# Patient Record
Sex: FEMALE | Race: WHITE | Employment: OTHER | URBAN - METROPOLITAN AREA
[De-identification: names, ages, dates, MRNs, and addresses within clinical notes are randomized per-mention and may not be internally consistent; named-entity substitution may affect disease eponyms.]

---

## 2017-01-19 ENCOUNTER — HOSPITAL ENCOUNTER (OUTPATIENT)
Dept: LAB | Age: 77
Discharge: HOME | End: 2017-01-19
Attending: INTERNAL MEDICINE
Payer: MEDICARE

## 2017-01-19 DIAGNOSIS — N18.30 CHRONIC KIDNEY DISEASE, STAGE 3 (HCC): ICD-10-CM

## 2017-01-19 LAB
ANION GAP BLD CALC-SCNC: 9 MMOL/L
BUN SERPL-MCNC: 27 MG/DL (ref 8–23)
CALCIUM SERPL-MCNC: 9.2 MG/DL (ref 8.3–10.4)
CHLORIDE SERPL-SCNC: 101 MMOL/L (ref 98–107)
CO2 SERPL-SCNC: 29 MMOL/L (ref 23–32)
CREAT SERPL-MCNC: 1.4 MG/DL (ref 0.6–1)
GLUCOSE SERPL-MCNC: 113 MG/DL (ref 65–100)
POTASSIUM SERPL-SCNC: 4.4 MMOL/L (ref 3.5–5.1)
SODIUM SERPL-SCNC: 139 MMOL/L (ref 136–145)

## 2017-01-19 PROCEDURE — 80048 BASIC METABOLIC PNL TOTAL CA: CPT | Performed by: INTERNAL MEDICINE

## 2017-01-19 PROCEDURE — 36415 COLL VENOUS BLD VENIPUNCTURE: CPT | Performed by: INTERNAL MEDICINE

## 2017-05-24 ENCOUNTER — HOSPITAL ENCOUNTER (OUTPATIENT)
Dept: MAMMOGRAPHY | Age: 77
Discharge: HOME | End: 2017-05-24
Attending: FAMILY MEDICINE
Payer: MEDICARE

## 2017-05-24 DIAGNOSIS — Z12.31 VISIT FOR SCREENING MAMMOGRAM: ICD-10-CM

## 2017-05-24 PROCEDURE — 77067 SCR MAMMO BI INCL CAD: CPT

## 2018-05-14 PROBLEM — I10 BENIGN ESSENTIAL HTN: Status: ACTIVE | Noted: 2018-05-14

## 2018-05-14 PROBLEM — M17.0 PRIMARY OSTEOARTHRITIS OF BOTH KNEES: Status: ACTIVE | Noted: 2018-05-14

## 2018-05-14 PROBLEM — R53.82 CHRONIC FATIGUE: Status: ACTIVE | Noted: 2018-05-14

## 2018-05-14 PROBLEM — E66.01 SEVERE OBESITY (BMI 35.0-39.9) WITH COMORBIDITY (HCC): Status: ACTIVE | Noted: 2018-05-14

## 2018-06-15 PROBLEM — Z00.00 ROUTINE GENERAL MEDICAL EXAMINATION AT A HEALTH CARE FACILITY: Status: ACTIVE | Noted: 2018-06-15

## 2018-07-02 ENCOUNTER — HOSPITAL ENCOUNTER (OUTPATIENT)
Dept: MAMMOGRAPHY | Age: 78
Discharge: HOME | End: 2018-07-02
Attending: FAMILY MEDICINE
Payer: MEDICARE

## 2018-07-02 DIAGNOSIS — Z12.31 VISIT FOR SCREENING MAMMOGRAM: ICD-10-CM

## 2018-07-02 PROCEDURE — 77067 SCR MAMMO BI INCL CAD: CPT

## 2018-11-01 PROBLEM — M94.0 COSTOCHONDRITIS: Status: ACTIVE | Noted: 2018-11-01

## 2018-11-19 PROBLEM — J30.9 ALLERGIC RHINITIS: Status: ACTIVE | Noted: 2018-11-19

## 2019-02-05 PROBLEM — J01.00 ACUTE NON-RECURRENT MAXILLARY SINUSITIS: Status: ACTIVE | Noted: 2019-02-05

## 2019-04-23 PROBLEM — M79.671 RIGHT FOOT PAIN: Status: ACTIVE | Noted: 2019-04-23

## 2019-05-13 PROBLEM — R19.7 DIARRHEA: Status: ACTIVE | Noted: 2019-05-13

## 2019-05-13 PROBLEM — K57.92 DIVERTICULITIS: Status: ACTIVE | Noted: 2019-05-13

## 2019-05-20 ENCOUNTER — HOSPITAL ENCOUNTER (OUTPATIENT)
Dept: CT IMAGING | Age: 79
Discharge: HOME | End: 2019-05-20
Attending: FAMILY MEDICINE
Payer: MEDICARE

## 2019-05-20 DIAGNOSIS — K57.92 DIVERTICULITIS: ICD-10-CM

## 2019-05-20 LAB — CREAT BLD-MCNC: 1.3 MG/DL (ref 0.8–1.5)

## 2019-05-20 PROCEDURE — 74176 CT ABD & PELVIS W/O CONTRAST: CPT

## 2019-05-20 PROCEDURE — 82565 ASSAY OF CREATININE: CPT

## 2019-05-20 PROCEDURE — 74011636320 HC RX REV CODE- 636/320: Performed by: FAMILY MEDICINE

## 2019-05-20 RX ADMIN — DIATRIZOATE MEGLUMINE AND DIATRIZOATE SODIUM 15 ML: 660; 100 LIQUID ORAL; RECTAL at 15:05

## 2019-05-20 NOTE — PROGRESS NOTES
Nothing obvious on her CT. Will wait on lab results. If they are also normal I think a trip to the gastroenterologist may be warranted. If labs look like infection we will treat it.

## 2019-05-24 PROBLEM — N28.89 RENAL MASS, LEFT: Status: ACTIVE | Noted: 2019-05-24

## 2019-05-24 PROBLEM — R10.12 LEFT UPPER QUADRANT PAIN: Status: ACTIVE | Noted: 2019-05-24

## 2019-05-28 PROBLEM — G89.29 CHRONIC ABDOMINAL PAIN: Status: ACTIVE | Noted: 2019-05-28

## 2019-05-28 PROBLEM — R10.9 CHRONIC ABDOMINAL PAIN: Status: ACTIVE | Noted: 2019-05-28

## 2019-06-20 ENCOUNTER — HOSPITAL ENCOUNTER (OUTPATIENT)
Dept: CT IMAGING | Age: 79
Discharge: HOME | End: 2019-06-20
Attending: UROLOGY
Payer: MEDICARE

## 2019-06-20 DIAGNOSIS — N28.89 RENAL MASS: ICD-10-CM

## 2019-06-20 LAB — CREAT BLD-MCNC: 1.2 MG/DL (ref 0.8–1.5)

## 2019-06-20 PROCEDURE — 74011000258 HC RX REV CODE- 258: Performed by: UROLOGY

## 2019-06-20 PROCEDURE — 74011636320 HC RX REV CODE- 636/320: Performed by: UROLOGY

## 2019-06-20 PROCEDURE — 82565 ASSAY OF CREATININE: CPT

## 2019-06-20 PROCEDURE — 74170 CT ABD WO CNTRST FLWD CNTRST: CPT

## 2019-06-20 RX ORDER — SODIUM CHLORIDE 0.9 % (FLUSH) 0.9 %
10 SYRINGE (ML) INJECTION
Status: COMPLETED | OUTPATIENT
Start: 2019-06-20 | End: 2019-06-20

## 2019-06-20 RX ADMIN — SODIUM CHLORIDE 100 ML: 900 INJECTION, SOLUTION INTRAVENOUS at 09:34

## 2019-06-20 RX ADMIN — Medication 10 ML: at 09:34

## 2019-06-20 RX ADMIN — IOPAMIDOL 100 ML: 755 INJECTION, SOLUTION INTRAVENOUS at 09:34

## 2019-06-25 PROBLEM — N28.1 RENAL CYST: Status: ACTIVE | Noted: 2019-05-24

## 2019-06-25 PROBLEM — Z23 ENCOUNTER FOR IMMUNIZATION: Status: ACTIVE | Noted: 2019-06-25

## 2019-09-19 PROBLEM — Z23 ENCOUNTER FOR IMMUNIZATION: Status: RESOLVED | Noted: 2019-06-25 | Resolved: 2019-09-19

## 2019-10-23 PROBLEM — K21.9 GASTROESOPHAGEAL REFLUX DISEASE WITHOUT ESOPHAGITIS: Status: ACTIVE | Noted: 2019-10-23

## 2020-05-14 PROBLEM — I35.0 NONRHEUMATIC AORTIC VALVE STENOSIS: Status: ACTIVE | Noted: 2020-05-14

## 2020-12-18 ENCOUNTER — HOSPITAL ENCOUNTER (OUTPATIENT)
Dept: ULTRASOUND IMAGING | Age: 80
Discharge: HOME | End: 2020-12-18
Attending: INTERNAL MEDICINE
Payer: MEDICARE

## 2020-12-18 DIAGNOSIS — I10 HYPERTENSION, ESSENTIAL: ICD-10-CM

## 2020-12-18 PROCEDURE — 93975 VASCULAR STUDY: CPT

## 2021-06-30 PROBLEM — Z00.00 ENCOUNTER FOR ANNUAL WELLNESS EXAM IN MEDICARE PATIENT: Status: ACTIVE | Noted: 2021-06-30

## 2021-07-30 PROBLEM — Z23 ENCOUNTER FOR IMMUNIZATION: Status: RESOLVED | Noted: 2019-06-25 | Resolved: 2021-07-30

## 2021-07-30 PROBLEM — Z00.00 ENCOUNTER FOR ANNUAL WELLNESS EXAM IN MEDICARE PATIENT: Status: RESOLVED | Noted: 2021-06-30 | Resolved: 2021-07-30

## 2022-03-18 PROBLEM — N28.1 RENAL CYST: Status: ACTIVE | Noted: 2019-05-24

## 2022-03-18 PROBLEM — E66.01 SEVERE OBESITY (BMI 35.0-39.9) WITH COMORBIDITY (HCC): Status: ACTIVE | Noted: 2018-05-14

## 2022-03-18 PROBLEM — J30.9 ALLERGIC RHINITIS: Status: ACTIVE | Noted: 2018-11-19

## 2022-03-18 PROBLEM — Z00.00 ROUTINE GENERAL MEDICAL EXAMINATION AT A HEALTH CARE FACILITY: Status: ACTIVE | Noted: 2018-06-15

## 2022-03-18 PROBLEM — G89.29 CHRONIC ABDOMINAL PAIN: Status: ACTIVE | Noted: 2019-05-28

## 2022-03-18 PROBLEM — R10.9 CHRONIC ABDOMINAL PAIN: Status: ACTIVE | Noted: 2019-05-28

## 2022-03-19 PROBLEM — R19.7 DIARRHEA: Status: ACTIVE | Noted: 2019-05-13

## 2022-03-19 PROBLEM — R53.82 CHRONIC FATIGUE: Status: ACTIVE | Noted: 2018-05-14

## 2022-03-19 PROBLEM — M94.0 COSTOCHONDRITIS: Status: ACTIVE | Noted: 2018-11-01

## 2022-03-19 PROBLEM — J01.00 ACUTE NON-RECURRENT MAXILLARY SINUSITIS: Status: ACTIVE | Noted: 2019-02-05

## 2022-03-19 PROBLEM — K21.9 GASTROESOPHAGEAL REFLUX DISEASE WITHOUT ESOPHAGITIS: Status: ACTIVE | Noted: 2019-10-23

## 2022-03-19 PROBLEM — R10.12 LEFT UPPER QUADRANT PAIN: Status: ACTIVE | Noted: 2019-05-24

## 2022-03-19 PROBLEM — M17.0 PRIMARY OSTEOARTHRITIS OF BOTH KNEES: Status: ACTIVE | Noted: 2018-05-14

## 2022-03-19 PROBLEM — I10 BENIGN ESSENTIAL HTN: Status: ACTIVE | Noted: 2018-05-14

## 2022-03-19 PROBLEM — K57.92 DIVERTICULITIS: Status: ACTIVE | Noted: 2019-05-13

## 2022-03-20 PROBLEM — I35.0 NONRHEUMATIC AORTIC VALVE STENOSIS: Status: ACTIVE | Noted: 2020-05-14

## 2022-03-20 PROBLEM — M79.671 RIGHT FOOT PAIN: Status: ACTIVE | Noted: 2019-04-23

## 2022-04-12 PROBLEM — M10.9 ACUTE GOUT INVOLVING TOE OF RIGHT FOOT: Status: ACTIVE | Noted: 2022-04-12

## 2022-04-27 PROBLEM — R68.89 FLU-LIKE SYMPTOMS: Status: ACTIVE | Noted: 2022-04-27

## 2022-06-29 ENCOUNTER — OFFICE VISIT (OUTPATIENT)
Dept: FAMILY MEDICINE CLINIC | Facility: CLINIC | Age: 82
End: 2022-06-29
Payer: MEDICARE

## 2022-06-29 VITALS
HEIGHT: 63 IN | OXYGEN SATURATION: 93 % | HEART RATE: 72 BPM | SYSTOLIC BLOOD PRESSURE: 170 MMHG | DIASTOLIC BLOOD PRESSURE: 66 MMHG | WEIGHT: 221 LBS | BODY MASS INDEX: 39.16 KG/M2 | RESPIRATION RATE: 18 BRPM | TEMPERATURE: 96.8 F

## 2022-06-29 DIAGNOSIS — M10.9 ACUTE GOUT INVOLVING TOE OF RIGHT FOOT, UNSPECIFIED CAUSE: ICD-10-CM

## 2022-06-29 DIAGNOSIS — R73.03 PREDIABETES: ICD-10-CM

## 2022-06-29 DIAGNOSIS — I10 BENIGN ESSENTIAL HTN: Primary | ICD-10-CM

## 2022-06-29 DIAGNOSIS — E78.5 HYPERLIPIDEMIA, UNSPECIFIED HYPERLIPIDEMIA TYPE: ICD-10-CM

## 2022-06-29 DIAGNOSIS — M79.89 LEFT LEG SWELLING: ICD-10-CM

## 2022-06-29 DIAGNOSIS — I10 BENIGN ESSENTIAL HTN: ICD-10-CM

## 2022-06-29 DIAGNOSIS — N18.31 STAGE 3A CHRONIC KIDNEY DISEASE (HCC): ICD-10-CM

## 2022-06-29 DIAGNOSIS — J30.9 ALLERGIC RHINITIS, UNSPECIFIED SEASONALITY, UNSPECIFIED TRIGGER: ICD-10-CM

## 2022-06-29 PROCEDURE — 99214 OFFICE O/P EST MOD 30 MIN: CPT | Performed by: FAMILY MEDICINE

## 2022-06-29 PROCEDURE — 1123F ACP DISCUSS/DSCN MKR DOCD: CPT | Performed by: FAMILY MEDICINE

## 2022-06-29 RX ORDER — OLMESARTAN MEDOXOMIL 40 MG/1
40 TABLET ORAL DAILY
Qty: 90 TABLET | Refills: 1 | Status: SHIPPED | OUTPATIENT
Start: 2022-06-29

## 2022-06-29 RX ORDER — ALLOPURINOL 100 MG/1
100 TABLET ORAL DAILY
Qty: 90 TABLET | Refills: 1 | Status: SHIPPED | OUTPATIENT
Start: 2022-06-29

## 2022-06-29 RX ORDER — DOXAZOSIN MESYLATE 1 MG/1
1 TABLET ORAL DAILY
Qty: 90 TABLET | Refills: 1 | Status: SHIPPED | OUTPATIENT
Start: 2022-06-29

## 2022-06-29 RX ORDER — FUROSEMIDE 20 MG/1
20 TABLET ORAL DAILY
Qty: 90 TABLET | Refills: 1 | Status: SHIPPED | OUTPATIENT
Start: 2022-06-29

## 2022-06-29 RX ORDER — FLUTICASONE PROPIONATE 50 MCG
2 SPRAY, SUSPENSION (ML) NASAL DAILY
Qty: 3 EACH | Refills: 3 | Status: SHIPPED | OUTPATIENT
Start: 2022-06-29

## 2022-06-29 RX ORDER — CARVEDILOL 3.12 MG/1
3.12 TABLET ORAL 2 TIMES DAILY WITH MEALS
Qty: 180 TABLET | Refills: 1 | Status: SHIPPED | OUTPATIENT
Start: 2022-06-29

## 2022-06-29 ASSESSMENT — PATIENT HEALTH QUESTIONNAIRE - PHQ9
2. FEELING DOWN, DEPRESSED OR HOPELESS: 0
SUM OF ALL RESPONSES TO PHQ QUESTIONS 1-9: 0
SUM OF ALL RESPONSES TO PHQ QUESTIONS 1-9: 0
1. LITTLE INTEREST OR PLEASURE IN DOING THINGS: 0
SUM OF ALL RESPONSES TO PHQ9 QUESTIONS 1 & 2: 0
SUM OF ALL RESPONSES TO PHQ QUESTIONS 1-9: 0
SUM OF ALL RESPONSES TO PHQ QUESTIONS 1-9: 0

## 2022-06-29 ASSESSMENT — ENCOUNTER SYMPTOMS
RHINORRHEA: 1
BLOOD IN STOOL: 0
COUGH: 1
SHORTNESS OF BREATH: 0
CHEST TIGHTNESS: 0
ABDOMINAL PAIN: 0

## 2022-06-29 NOTE — PROGRESS NOTES
[place]  _______________________________________  MD Sisi Jimenez, RYANN Roberts MD Jetta Justin, [address]  Phone: [phone]  Fax: [phone]    Armando Gauthier (:  1940) is a 78 y.o. female,Established patient, here for evaluation of the following chief complaint(s):  6 Month Follow-Up and Swelling (Int eh right ankle and foot, on/off for a couple of weeks.)         ASSESSMENT/PLAN:    1. Benign essential HTN  Not controlled, but missing lasix will restart that. - furosemide (LASIX) 20 MG tablet; Take 1 tablet by mouth daily  Dispense: 90 tablet; Refill: 1  - carvedilol (COREG) 3.125 MG tablet; Take 1 tablet by mouth 2 times daily (with meals)  Dispense: 180 tablet; Refill: 1  - doxazosin (CARDURA) 1 MG tablet; Take 1 tablet by mouth daily  Dispense: 90 tablet; Refill: 1  - olmesartan (BENICAR) 40 MG tablet; Take 1 tablet by mouth daily TAKE ONE TABLET BY MOUTH ONE TIME DAILY  Dispense: 90 tablet; Refill: 1  - Comprehensive Metabolic Panel; Future    2. Acute gout involving toe of right foot, unspecified cause  Check gout levels, if still high, increase dosing of allipurinol.   - allopurinol (ZYLOPRIM) 100 MG tablet; Take 1 tablet by mouth daily  Dispense: 90 tablet; Refill: 1  - Comprehensive Metabolic Panel; Future  - Uric Acid; Future    3. Stage 3a chronic kidney disease ([facility] [address]  She has unilateral swelling that comes and goes, doesn't look like she's ever had renal US, will check that. - Comprehensive Metabolic Panel; Future  - US RETROPERITONEAL COMPLETE; Future    4. Hyperlipidemia, unspecified hyperlipidemia type  Stable on current therapy, will check LFTs. - Comprehensive Metabolic Panel; Future    5. Prediabetes  Stable, check A1C and renal function. - Comprehensive Metabolic Panel; Future  - Hemoglobin A1C; Future    6.  Allergic rhinitis, unspecified seasonality, unspecified trigger  Not controlled, add back flonase.   - fluticasone (FLONASE) 50 MCG/ACT nasal spray; 2 sprays by Nasal route daily  Dispense: 3 each; Refill: 3    7. Left leg swelling  As above, checking her kidneys. Does not look like a clot at this point.   - US RETROPERITONEAL COMPLETE; Future    FU 4w for AWV by phone    Subjective   SUBJECTIVE/OBJECTIVE:      HTN: She is currently also being seen by cardiology yearly. She was worked up for CP and this was negative. She has multiple medication intolerances including swelling with amlodipine and lisinopril. HCTZ caused gout. Bystolic caused bradycardia and she was switched to coreg. Her regimen is now coreg 3.125 BID, omlesartan 40, lasix 20 daily (added when she saw cardiology in late 2019) and doxazosin 1mg. She states BPs at home will run in the 140s/70s.  BP today is about where she lives. Saw Cards fall '21, they are now seeing her yearly. BP Readings from Last 3 Encounters:   06/29/22 (!) 170/66   04/12/22 (!) 166/86   12/30/21 (!) 148/79          She was found to have stable aortic sclerosis. Dr. Stacy Gonazlez recommended repeat echo if her exam or symptoms change. No ESTEBAN or chest pain. Has been doing yard work without issue. No ESTEBAN walking up stairs.      Lab Results   Component Value Date     04/12/2022    K 4.3 04/12/2022     04/12/2022    CO2 24 04/12/2022    BUN 24 04/12/2022    CREATININE 1.35 04/12/2022    GLUCOSE 102 04/12/2022    CALCIUM 9.8 04/12/2022    LABGLOM 46 06/20/2019           No results found for: LABA1C  No results found for: EAG    A1C 5.7 prevously, managed with diet.         AR: She has been using flonase with singulair with good results. Asks for refill of singulair.       Gout: Had some foot pain that turned out to be gout, now on allopurinol 100. Lab Results   Component Value Date    LABURIC 8.2 (H) 04/12/2022         HM:  AWV due next week    Review of Systems   Constitutional: Negative for chills and fever. HENT: Positive for rhinorrhea. Respiratory: Positive for cough. Negative for chest tightness and shortness of breath. Cardiovascular: Positive for leg swelling. Negative for chest pain. Gastrointestinal: Negative for abdominal pain and blood in stool. Genitourinary: Negative for hematuria. Neurological: Negative for syncope. Objective   Physical Exam  Vitals and nursing note reviewed. Constitutional:       Appearance: Normal appearance. HENT:      Head: Normocephalic and atraumatic. Right Ear: External ear normal.      Left Ear: External ear normal.      Ears:      Comments: Retracted TMs BL     Nose: Rhinorrhea present. Mouth/Throat:      Mouth: Mucous membranes are moist.   Eyes:      General: No scleral icterus. Extraocular Movements: Extraocular movements intact. Pupils: Pupils are equal, round, and reactive to light. Cardiovascular:      Rate and Rhythm: Normal rate and regular rhythm. Pulses: Normal pulses. Heart sounds: No murmur heard. No friction rub. No gallop. Pulmonary:      Effort: Pulmonary effort is normal. No respiratory distress. Breath sounds: Normal breath sounds. No stridor. No wheezing. Abdominal:      General: Bowel sounds are normal. There is no distension. Tenderness: There is no abdominal tenderness. There is no right CVA tenderness or left CVA tenderness. Comments: Obese   Musculoskeletal:         General: No swelling or tenderness. Normal range of motion. Cervical back: Normal range of motion. No rigidity. Right lower leg: Edema present. Left lower leg: No edema. Comments: 1+ pitting edema to the shins on the right    Skin:     General: Skin is warm and dry. Coloration: Skin is not pale. Neurological:      General: No focal deficit present. Mental Status: She is alert and oriented to person, place, and time. Mental status is at baseline.       Cranial Nerves: No cranial nerve deficit. Deep Tendon Reflexes: Reflexes normal.   Psychiatric:         Mood and Affect: Mood normal.         Behavior: Behavior normal.         Thought Content: Thought content normal.         Judgment: Judgment normal.                      An electronic signature was used to authenticate this note.     --Isaiah Michelle MD

## 2022-06-30 LAB
ALBUMIN SERPL-MCNC: 3.9 G/DL (ref 3.2–4.6)
ALBUMIN/GLOB SERPL: 1.4 {RATIO} (ref 1.2–3.5)
ALP SERPL-CCNC: 60 U/L (ref 50–136)
ALT SERPL-CCNC: 17 U/L (ref 12–65)
ANION GAP SERPL CALC-SCNC: 6 MMOL/L (ref 7–16)
AST SERPL-CCNC: 12 U/L (ref 15–37)
BILIRUB SERPL-MCNC: 0.5 MG/DL (ref 0.2–1.1)
BUN SERPL-MCNC: 25 MG/DL (ref 8–23)
CALCIUM SERPL-MCNC: 9.5 MG/DL (ref 8.3–10.4)
CHLORIDE SERPL-SCNC: 105 MMOL/L (ref 98–107)
CO2 SERPL-SCNC: 30 MMOL/L (ref 21–32)
CREAT SERPL-MCNC: 1.2 MG/DL (ref 0.6–1)
EST. AVERAGE GLUCOSE BLD GHB EST-MCNC: 114 MG/DL
GLOBULIN SER CALC-MCNC: 2.7 G/DL (ref 2.3–3.5)
GLUCOSE SERPL-MCNC: 109 MG/DL (ref 65–100)
HBA1C MFR BLD: 5.6 % (ref 4.2–6.3)
POTASSIUM SERPL-SCNC: 4.8 MMOL/L (ref 3.5–5.1)
PROT SERPL-MCNC: 6.6 G/DL (ref 6.3–8.2)
SODIUM SERPL-SCNC: 141 MMOL/L (ref 136–145)
URATE SERPL-MCNC: 5.3 MG/DL (ref 2.6–6)

## 2022-07-06 ENCOUNTER — HOSPITAL ENCOUNTER (OUTPATIENT)
Dept: ULTRASOUND IMAGING | Age: 82
Discharge: HOME | End: 2022-07-08
Payer: MEDICARE

## 2022-07-06 DIAGNOSIS — N18.31 STAGE 3A CHRONIC KIDNEY DISEASE (HCC): ICD-10-CM

## 2022-07-06 DIAGNOSIS — M79.89 LEFT LEG SWELLING: ICD-10-CM

## 2022-07-06 PROCEDURE — 76770 US EXAM ABDO BACK WALL COMP: CPT

## 2022-07-27 ENCOUNTER — TELEMEDICINE (OUTPATIENT)
Dept: FAMILY MEDICINE CLINIC | Facility: CLINIC | Age: 82
End: 2022-07-27
Payer: MEDICARE

## 2022-07-27 DIAGNOSIS — Z00.00 ENCOUNTER FOR ANNUAL WELLNESS EXAM IN MEDICARE PATIENT: Primary | ICD-10-CM

## 2022-07-27 PROCEDURE — 1123F ACP DISCUSS/DSCN MKR DOCD: CPT | Performed by: FAMILY MEDICINE

## 2022-07-27 PROCEDURE — G0439 PPPS, SUBSEQ VISIT: HCPCS | Performed by: FAMILY MEDICINE

## 2022-07-27 SDOH — ECONOMIC STABILITY: FOOD INSECURITY: WITHIN THE PAST 12 MONTHS, THE FOOD YOU BOUGHT JUST DIDN'T LAST AND YOU DIDN'T HAVE MONEY TO GET MORE.: NEVER TRUE

## 2022-07-27 SDOH — ECONOMIC STABILITY: FOOD INSECURITY: WITHIN THE PAST 12 MONTHS, YOU WORRIED THAT YOUR FOOD WOULD RUN OUT BEFORE YOU GOT MONEY TO BUY MORE.: NEVER TRUE

## 2022-07-27 ASSESSMENT — PATIENT HEALTH QUESTIONNAIRE - PHQ9
SUM OF ALL RESPONSES TO PHQ QUESTIONS 1-9: 0
1. LITTLE INTEREST OR PLEASURE IN DOING THINGS: 0
2. FEELING DOWN, DEPRESSED OR HOPELESS: 0
SUM OF ALL RESPONSES TO PHQ9 QUESTIONS 1 & 2: 0

## 2022-07-27 ASSESSMENT — SOCIAL DETERMINANTS OF HEALTH (SDOH): HOW HARD IS IT FOR YOU TO PAY FOR THE VERY BASICS LIKE FOOD, HOUSING, MEDICAL CARE, AND HEATING?: NOT HARD AT ALL

## 2022-07-27 ASSESSMENT — LIFESTYLE VARIABLES
HOW MANY STANDARD DRINKS CONTAINING ALCOHOL DO YOU HAVE ON A TYPICAL DAY: PATIENT DOES NOT DRINK
HOW OFTEN DO YOU HAVE A DRINK CONTAINING ALCOHOL: NEVER

## 2022-07-27 NOTE — PROGRESS NOTES
Medicare Annual Wellness Visit    Miguel Brice is here for Medicare AWV    Assessment & Plan     1. Encounter for annual wellness exam in Medicare patient  Doing well. Will complete living will . Does not want COVID vax. Reviewed prior DXA, she is low risk for osteoporosis. Will Get TD updated if she has a lac or puncture wound. FU 5m as planned    Recommendations for Preventive Services Due: see orders and patient instructions/AVS.  Recommended screening schedule for the next 5-10 years is provided to the patient in written form: see Patient Instructions/AVS.     Return for Medicare Annual Wellness Visit in 1 year. Subjective         HM: Has not had DXA in several years, was told in the past she had osteopenia. No fractures last DXA. IN the past had a tibial fracture as a child. Reviewed DXA from 2015, mild osteopenia. Patient's complete Health Risk Assessment and screening values have been reviewed and are found in Flowsheets. The following problems were reviewed today and where indicated follow up appointments were made and/or referrals ordered. Positive Risk Factor Screenings with Interventions:              Health Habits/Nutrition:  Physical Activity: Sufficiently Active    Days of Exercise per Week: 4 days    Minutes of Exercise per Session: 60 min     Have you lost any weight without trying in the past 3 months?: No     Have you seen the dentist within the past year?: Yes  Health Habits/Nutrition Interventions:  Inadequate physical activity:  Encouraged her to walk more, but indoors at this time of the year. Objective      Patient-Reported Vitals  No data recorded          Allergies   Allergen Reactions    Amlodipine Swelling    Amlodipine-Olmesartan Swelling    Atorvastatin Other (See Comments)    Lisinopril Swelling     Lip and severe facial swelling    Penicillins Rash     Prior to Visit Medications    Medication Sig Taking?  Authorizing Provider   furosemide [id number] [id number].

## 2022-07-27 NOTE — PATIENT INSTRUCTIONS
Personalized Preventive Plan for Shanel Whitehead - 7/27/2022  Medicare offers a range of preventive health benefits. Some of the tests and screenings are paid in full while other may be subject to a deductible, co-insurance, and/or copay. Some of these benefits include a comprehensive review of your medical history including lifestyle, illnesses that may run in your family, and various assessments and screenings as appropriate. After reviewing your medical record and screening and assessments performed today your provider may have ordered immunizations, labs, imaging, and/or referrals for you. A list of these orders (if applicable) as well as your Preventive Care list are included within your After Visit Summary for your review. Other Preventive Recommendations:    A preventive eye exam performed by an eye specialist is recommended every 1-2 years to screen for glaucoma; cataracts, macular degeneration, and other eye disorders. A preventive dental visit is recommended every 6 months. Try to get at least 150 minutes of exercise per week or 10,000 steps per day on a pedometer . Order or download the FREE \"Exercise & Physical Activity: Your Everyday Guide\" from [employer]. Call 5-597.403.4515 or search [employer] online. You need 8095-0561 mg of calcium and 8415-3458 IU of vitamin D per day. It is possible to meet your calcium requirement with diet alone, but a vitamin D supplement is usually necessary to meet this goal.  When exposed to the sun, use a sunscreen that protects against both UVA and UVB radiation with an SPF of 30 or greater. Reapply every 2 to 3 hours or after sweating, drying off with a towel, or swimming. Always wear a seat belt when traveling in a car. Always wear a helmet when riding a bicycle or motorcycle.

## 2022-09-08 ENCOUNTER — OFFICE VISIT (OUTPATIENT)
Dept: FAMILY MEDICINE CLINIC | Facility: CLINIC | Age: 82
End: 2022-09-08
Payer: MEDICARE

## 2022-09-08 VITALS
BODY MASS INDEX: 38.98 KG/M2 | HEIGHT: 63 IN | DIASTOLIC BLOOD PRESSURE: 75 MMHG | SYSTOLIC BLOOD PRESSURE: 143 MMHG | WEIGHT: 220 LBS

## 2022-09-08 DIAGNOSIS — I10 BENIGN ESSENTIAL HTN: ICD-10-CM

## 2022-09-08 DIAGNOSIS — M54.14 THORACIC RADICULOPATHY: Primary | ICD-10-CM

## 2022-09-08 PROCEDURE — 99214 OFFICE O/P EST MOD 30 MIN: CPT | Performed by: FAMILY MEDICINE

## 2022-09-08 PROCEDURE — 1123F ACP DISCUSS/DSCN MKR DOCD: CPT | Performed by: FAMILY MEDICINE

## 2022-09-08 RX ORDER — PREDNISONE 20 MG/1
40 TABLET ORAL DAILY
Qty: 10 TABLET | Refills: 0 | Status: SHIPPED | OUTPATIENT
Start: 2022-09-08 | End: 2022-09-13

## 2022-09-08 ASSESSMENT — PATIENT HEALTH QUESTIONNAIRE - PHQ9
SUM OF ALL RESPONSES TO PHQ QUESTIONS 1-9: 0
SUM OF ALL RESPONSES TO PHQ QUESTIONS 1-9: 0
SUM OF ALL RESPONSES TO PHQ9 QUESTIONS 1 & 2: 0
2. FEELING DOWN, DEPRESSED OR HOPELESS: 0
SUM OF ALL RESPONSES TO PHQ QUESTIONS 1-9: 0
1. LITTLE INTEREST OR PLEASURE IN DOING THINGS: 0
SUM OF ALL RESPONSES TO PHQ QUESTIONS 1-9: 0

## 2022-09-08 ASSESSMENT — ENCOUNTER SYMPTOMS
CHEST TIGHTNESS: 0
ABDOMINAL PAIN: 0
BLOOD IN STOOL: 0
BACK PAIN: 1
SHORTNESS OF BREATH: 0

## 2022-09-15 ENCOUNTER — OFFICE VISIT (OUTPATIENT)
Dept: FAMILY MEDICINE CLINIC | Facility: CLINIC | Age: 82
End: 2022-09-15
Payer: MEDICARE

## 2022-09-15 VITALS
SYSTOLIC BLOOD PRESSURE: 130 MMHG | HEIGHT: 63 IN | DIASTOLIC BLOOD PRESSURE: 60 MMHG | WEIGHT: 220 LBS | BODY MASS INDEX: 38.98 KG/M2

## 2022-09-15 DIAGNOSIS — M54.14 THORACIC RADICULOPATHY: ICD-10-CM

## 2022-09-15 DIAGNOSIS — R73.03 PREDIABETES: ICD-10-CM

## 2022-09-15 DIAGNOSIS — K21.9 GASTROESOPHAGEAL REFLUX DISEASE WITHOUT ESOPHAGITIS: ICD-10-CM

## 2022-09-15 DIAGNOSIS — I10 BENIGN ESSENTIAL HTN: Primary | ICD-10-CM

## 2022-09-15 DIAGNOSIS — I10 BENIGN ESSENTIAL HTN: ICD-10-CM

## 2022-09-15 DIAGNOSIS — N18.31 STAGE 3A CHRONIC KIDNEY DISEASE (HCC): ICD-10-CM

## 2022-09-15 LAB
ALBUMIN SERPL-MCNC: 3.8 G/DL (ref 3.2–4.6)
ALBUMIN/GLOB SERPL: 1.4 {RATIO} (ref 1.2–3.5)
ALP SERPL-CCNC: 57 U/L (ref 50–136)
ALT SERPL-CCNC: 33 U/L (ref 12–65)
ANION GAP SERPL CALC-SCNC: 3 MMOL/L (ref 4–13)
AST SERPL-CCNC: 13 U/L (ref 15–37)
BILIRUB SERPL-MCNC: 0.5 MG/DL (ref 0.2–1.1)
BUN SERPL-MCNC: 38 MG/DL (ref 8–23)
CALCIUM SERPL-MCNC: 9.3 MG/DL (ref 8.3–10.4)
CHLORIDE SERPL-SCNC: 106 MMOL/L (ref 101–110)
CO2 SERPL-SCNC: 30 MMOL/L (ref 21–32)
CREAT SERPL-MCNC: 1.5 MG/DL (ref 0.6–1)
EST. AVERAGE GLUCOSE BLD GHB EST-MCNC: 117 MG/DL
GLOBULIN SER CALC-MCNC: 2.7 G/DL (ref 2.3–3.5)
GLUCOSE SERPL-MCNC: 116 MG/DL (ref 65–100)
HBA1C MFR BLD: 5.7 % (ref 4.8–5.6)
POTASSIUM SERPL-SCNC: 4.1 MMOL/L (ref 3.5–5.1)
PROT SERPL-MCNC: 6.5 G/DL (ref 6.3–8.2)
SODIUM SERPL-SCNC: 139 MMOL/L (ref 136–145)

## 2022-09-15 PROCEDURE — 1123F ACP DISCUSS/DSCN MKR DOCD: CPT | Performed by: FAMILY MEDICINE

## 2022-09-15 PROCEDURE — 99214 OFFICE O/P EST MOD 30 MIN: CPT | Performed by: FAMILY MEDICINE

## 2022-09-15 ASSESSMENT — ENCOUNTER SYMPTOMS
CHEST TIGHTNESS: 0
BLOOD IN STOOL: 0
SHORTNESS OF BREATH: 0
ABDOMINAL PAIN: 0

## 2022-09-15 NOTE — PROGRESS NOTES
[place]  _______________________________________  MD Chirag Pulido Forward, DO  Meli Rodriguez, NP [place], [place]    [address] [address] , [address] [place] [address] Avenue  Phone: [phone]  Fax: [phone]    Cassy Sharif (:  1940) is a 78 y.o. female,Established patient, here for evaluation of the following chief complaint(s): Other (Feels like there is someone dragging a hair or feather across her R shoulder blade and armpit. Sometimes will feel like a burning a sensation. X 2 weeks )         ASSESSMENT/PLAN:    1. Benign essential HTN  BP really good today. Stable. Continue current regimen, check renal function. - Comprehensive Metabolic Panel; Future    2. Gastroesophageal reflux disease without esophagitis  Stable, continue current regimen. Check H/H.   - CBC with Auto Differential; Future    3. Thoracic radiculopathy  I am pretty sure this is what's going on. Will send to PT to help with this. If lymphocytes high on CBC, consider empiric valtrex before PT.   [facility], [facility]    4. Stage 3a chronic kidney disease (HCC)  Stable, continue current regimen. - Comprehensive Metabolic Panel; Future    5. Prediabetes  Stable, check A1C and renal function. - Comprehensive Metabolic Panel; Future  - Hemoglobin A1C; Future    FU 6m if labs OK    Subjective   SUBJECTIVE/OBJECTIVE:      HTN: She is currently also being seen by cardiology yearly. She was worked up for CP and this was negative. She has multiple medication intolerances including swelling with amlodipine and lisinopril. HCTZ caused gout. Bystolic caused bradycardia and she was switched to coreg. Her regimen is now coreg 3.125 BID, omlesartan 40, lasix 20 daily (added when she saw cardiology in late 2019) and doxazosin 1mg. She states BPs at home will run in the 140s/70s. BP today is about where she lives.  Saw Cards , they are ear normal.      Mouth/Throat:      Mouth: Mucous membranes are moist.   Eyes:      General: No scleral icterus. Extraocular Movements: Extraocular movements intact. Pupils: Pupils are equal, round, and reactive to light. Cardiovascular:      Rate and Rhythm: Normal rate and regular rhythm. Pulses: Normal pulses. Heart sounds: No murmur heard. No friction rub. No gallop. Pulmonary:      Effort: Pulmonary effort is normal. No respiratory distress. Breath sounds: Normal breath sounds. No stridor. No wheezing. Abdominal:      General: Bowel sounds are normal. There is no distension. Tenderness: There is no abdominal tenderness. There is no right CVA tenderness or left CVA tenderness. Comments: Obese   Musculoskeletal:         General: Tenderness present. No swelling. Normal range of motion. Cervical back: Normal range of motion. No rigidity. Right lower leg: No edema. Left lower leg: No edema. Comments: TTP near right T3-4 interspace, this makes her pain she described worse   Skin:     General: Skin is warm and dry. Coloration: Skin is not pale. Neurological:      General: No focal deficit present. Mental Status: She is alert and oriented to person, place, and time. Mental status is at baseline. Cranial Nerves: No cranial nerve deficit. Deep Tendon Reflexes: Reflexes normal.   Psychiatric:         Mood and Affect: Mood normal.         Behavior: Behavior normal.         Thought Content: Thought content normal.         Judgment: Judgment normal.                    An electronic signature was used to authenticate this note.     --Girish Block MD

## 2022-09-18 LAB
BASOPHILS # BLD: 0.1 K/UL (ref 0–0.2)
BASOPHILS NFR BLD: 1 % (ref 0–2)
DIFFERENTIAL METHOD BLD: ABNORMAL
EOSINOPHIL # BLD: 0.2 K/UL (ref 0–0.8)
EOSINOPHIL NFR BLD: 2 % (ref 0.5–7.8)
ERYTHROCYTE [DISTWIDTH] IN BLOOD BY AUTOMATED COUNT: 15.2 % (ref 11.9–14.6)
HCT VFR BLD AUTO: 48.8 % (ref 35.8–46.3)
HGB BLD-MCNC: 13.9 G/DL (ref 11.7–15.4)
IMM GRANULOCYTES # BLD AUTO: 0.1 K/UL (ref 0–0.5)
IMM GRANULOCYTES NFR BLD AUTO: 1 % (ref 0–5)
LYMPHOCYTES # BLD: 2.2 K/UL (ref 0.5–4.6)
LYMPHOCYTES NFR BLD: 24 % (ref 13–44)
MCH RBC QN AUTO: 30.4 PG (ref 26.1–32.9)
MCHC RBC AUTO-ENTMCNC: 28.5 G/DL (ref 31.4–35)
MCV RBC AUTO: 106.8 FL (ref 79.6–97.8)
MONOCYTES # BLD: 0.8 K/UL (ref 0.1–1.3)
MONOCYTES NFR BLD: 8 % (ref 4–12)
NEUTS SEG # BLD: 6.1 K/UL (ref 1.7–8.2)
NEUTS SEG NFR BLD: 65 % (ref 43–78)
NRBC # BLD: 0 K/UL (ref 0–0.2)
PLATELET # BLD AUTO: 286 K/UL (ref 150–450)
PMV BLD AUTO: 10.5 FL (ref 9.4–12.3)
RBC # BLD AUTO: 4.57 M/UL (ref 4.05–5.2)
WBC # BLD AUTO: 9.4 K/UL (ref 4.3–11.1)

## 2022-09-20 DIAGNOSIS — D53.1 MEGALOBLASTIC ANEMIA: ICD-10-CM

## 2022-09-20 DIAGNOSIS — D53.1 MEGALOBLASTIC ANEMIA: Primary | ICD-10-CM

## 2022-09-21 DIAGNOSIS — N18.32 STAGE 3B CHRONIC KIDNEY DISEASE (HCC): Primary | ICD-10-CM

## 2022-09-21 LAB
FOLATE SERPL-MCNC: 35.9 NG/ML (ref 3.1–17.5)
VIT B12 SERPL-MCNC: 403 PG/ML (ref 193–986)

## 2022-09-27 ENCOUNTER — HOSPITAL ENCOUNTER (OUTPATIENT)
Dept: PHYSICAL THERAPY | Age: 82
Setting detail: RECURRING SERIES
Discharge: HOME | End: 2022-09-30
Payer: MEDICARE

## 2022-09-27 PROCEDURE — 97161 PT EVAL LOW COMPLEX 20 MIN: CPT

## 2022-09-27 PROCEDURE — 97140 MANUAL THERAPY 1/> REGIONS: CPT

## 2022-09-27 ASSESSMENT — PAIN SCALES - GENERAL: PAINLEVEL_OUTOF10: 4

## 2022-09-27 NOTE — PROGRESS NOTES
Tad Castano  : 1940  Primary: [employer]  Secondary:  [address] [id number]  Phone: [phone]  Fax: [phone] Plan Frequency: 2x a week for 90 days    Plan of Care/Certification Expiration Date: 22      PT Visit Info:  No data recorded   Visit Count:  1   OUTPATIENT PHYSICAL THERAPY:OP NOTE TYPE: Treatment Note 2022       Episode  }Appt Desk             Treatment Diagnosis:  Thoracic Radiculopathy [M54.14]; Strain of Muscle and Tendon of Rotator Cuff, Sequela [S46.011S]; Medical/Referring Diagnosis:  Thoracic radiculopathy [M54.14]  Referring Physician:  Javan Lentz MD MD Orders:  PT Eval and Treat   Date of Onset:  Onset Date: 22     Allergies:   Amlodipine, Amlodipine-olmesartan, Atorvastatin, Lisinopril, and Penicillins  Restrictions/Precautions:  Restrictions/Precautions: None  No data recorded     Interventions Planned (Treatment may consist of any combination of the following):    Current Treatment Recommendations: Strengthening; ROM; Endurance training; Neuromuscular re-education; Manual Therapy - Soft Tissue Mobilization; Manual Therapy - Joint Manipulation; Home exercise program; Modalities; Integrated dry needling; Therapeutic activities     Subjective Comments: Pt. Notes abnormal sensation in the R posterior shoulder     Initial:}    4/10Post Session:       3/10  Medications Last Reviewed:  2022  Updated Objective Findings:  See evaluation note from today  Treatment   THERAPEUTIC EXERCISE: (5 minutes):    Exercises per grid below to improve mobility and strength. Required minimal visual, verbal, and manual cues to promote proper body alignment, promote proper body posture, and promote proper body mechanics. Progressed resistance, range, and repetitions as indicated.    Date:  2022   Activity/Exercise Parameters   Chest stretch 2 minutes   Latissimus stretch 2 minutes Scapular retraction 1 minute                     Manual Therapy (10  Minutes): Manual techniques to facilitate improved motion and decrease pain. (Used abbreviations; PNF - proprioceptive neuromuscular facilitation; a/p - anterior to posterior; p/a - posterior to anterior; cpa - central posterior anterior; upa -unilateral posterior anterior; SAL- superior anterior lateral glide; HVLAT - High Velocity Low Amplitude Thrust)  Soft tissue mobilizaiton: R lateral scapular musculature, subscapularis,   Manual cervical traction    Treatment/Session Summary:    Treatment Assessment:     Communication/Consultation:  None today  Equipment provided today:  None  Recommendations/Intent for next treatment session: Next visit will focus on soft tissue mobilization and manual therapy. Total Treatment Billable Duration:  45 minutes evaluation, 10 minutes manual therapy, 5 minutes therapeutic exercise.    Time In: 1430  Time Out: 410 [address] [place], PT       Charge Capture  }Post Session Pain  PT Visit Info  [employer] Portal  MD Guidelines  Scanned Media  Benefits  MyChart    Future Appointments   Date Time Provider [facility]   10/4/2022  2:30 PM Fleet Joy, PT [facility]   10/6/2022  2:30 PM Fleet Joy, PT [facility]   10/10/2022  2:30 PM Fleet Joy, PT [facility]   10/20/2022  2:30 PM Fleet Joy, PT [facility]   10/25/2022  2:30 PM Fleet Joy, PT [facility]   10/27/2022  2:30 PM Fleet Joy, PT [facility]   12/29/2022 11:20 AM Kraig Almazan MD [facility]   3/15/2023 11:00 AM Kraig Almazan MD [facility]

## 2022-09-27 NOTE — THERAPY EVALUATION
Tad Castano  : 1940  Primary: Sonia Almazan [employer]  Secondary:  [address] @ [address]  [address] [place], [place] [address]  Phone: [phone]  Fax: [phone] Plan Frequency: 2x a week for 90 days  Plan of Care/Certification Expiration Date: 22    PT Visit Info:         Visit Count:  1    OUTPATIENT PHYSICAL THERAPY:OP NOTE TYPE: Initial Assessment 2022               Episode  Appt Desk         Treatment Diagnosis:  Thoracic Radiculopathy [M54.14]; Strain of Muscle and Tendon of Rotator Cuff, Sequela [S46.011S]; Medical/Referring Diagnosis:  Thoracic radiculopathy [M54.14]  Referring Physician:  Javan Lentz MD MD Orders:  PT Eval and Treat   Return MD Appt:  TBD  Date of Onset:  Onset Date: 22   Allergies:  Amlodipine, Amlodipine-olmesartan, Atorvastatin, Lisinopril, and Penicillins  Restrictions/Precautions:    Restrictions/Precautions: None      Medications Last Reviewed:  2022     SUBJECTIVE   History of Injury/Illness (Reason for Referral):  Pt. Attends PT c/o R posterior shoulder tingling, burning, itching with gradual insidious onset the beginning of September. Pt. Notes she was trimming bushes which may have started symptoms. The abnormal sensation is located along the lateral shoulder blade, armpit, posterior arm. Pt. Notes symptoms increased while sleeping at night that will wake her up. Intensity comes and goes but is mostly there. Pt. Denies neck pain, weakness, etc.  Pt. Has been cleared of any heart complications. Pt. Would like to address remaining abnormal sensation.    Patient Stated Goal(s):  \"get rid of abnormal sensation in the shoulder\"  Initial:     4/10 Post Session:     3/10  Past Medical History/Comorbidities:   Ms. Katherin Lees  has a past medical history of Abnormal EKG, Arthritis, Chest pain, Chronic kidney disease, stage 3 ([facility] [address] Chronic pain, Elevated cholesterol, Hormone replacement therapy (HRT), Hypertension, Macular degeneration, Mixed hyperlipidemia, Obesity, Psoriasis, and Renal insufficiency. Ms. Ashok Mckeon  has a past surgical history that includes Total knee arthroplasty (Left, 2012); Cholecystectomy (1999); lap,cholecystectomy; Hysterectomy (1972); Ovary removal (1975); Cholecystectomy (2000); Tonsillectomy (1940's); and heent (1/3/12). Social History/Living Environment:   Lives With: Spouse  Type of Home: House  Home Layout: Two level     Prior Level of Function/Work/Activity:   Prior level of function: functioned independently without limitations. Learning:   Does the patient/guardian have any barriers to learning?: No barriers  Will there be a co-learner?: No  What is the preferred language of the patient/guardian?: English  Is an  required?: No  How does the patient/guardian prefer to learn new concepts?: Listening; Reading; Demonstration     Fall Risk Scale:    Mccall Total Score: 0  Mccall Fall Risk: Low (0-24)           OBJECTIVE   Observation:       Palpation:  Upper Trapezius: tender Levator Scapulae: Posterior Cervical   Sternocleidomastoid: Subscapularis: tender Lateral scapular muscles: tender       Flexibility:  limited pectoralis, upper trapezius latissimus dorsi      Range of Motion: Cervical in degrees  Flexion                 38                      Extension 40                      Right                     Left   Rotation 45 48   Side Bend 20 20     Thoracic flexion, sidebend, rotation WNL and no increase in symptoms  Central P/A to lower cervical and thoracic spine hypomobile, no changes in abnormal sensation    Strength: Manual Muscle Test: upper quarter screen 5/5 MMT, middle and lower trapezius 4/5 MMT      Special Testing:  Spurlings Right: (-) Left: (-)   Upper limb Tension Test (ULTT1)     Cervical Distraction (-) (-)   Cervical flexion rotation test     Other:       Neuro: UE reflexes 1+  ASSESSMENT   Initial Assessment:  Upon PT assessment, subjective symptoms appear radicular in nature for the R scapular region but provocative testing in the cervical and thoracic spine fails to reproduce or reduce subjective complaints. Pt. Demonstrates moderate myofascial tenderness to palpation of the R infraspinatus, teres major, teres minor, and latissimus dorsi musculature. Soft tissue mobilization to this region improves reported symptoms today. Pt. Demonstrates flexibility limitations in the pectoralis and latissimus dorsi musculature,  Mild strength deficits are present in the scapular region. Pt. Will benefit from manual therapy to address myofascial pain along with mobility exercises and manual cervical traction to address potential radicular symptosm. Problem List: (Impacting functional limitations): Body Structures, Functions, Activity Limitations Requiring Skilled Therapeutic Intervention: Decreased functional mobility ; Decreased ROM; Decreased strength; Decreased endurance; Increased pain; Decreased posture     Therapy Prognosis:   Therapy Prognosis: Excellent     Assessment Complexity:   Low Complexity  PLAN   Effective Dates: 9/27/2022 TO Plan of Care/Certification Expiration Date: 12/26/22   Frequency/Duration: Plan Frequency: 2x a week for 90 days   Interventions Planned (Treatment may consist of any combination of the following):    Current Treatment Recommendations: Strengthening; ROM; Endurance training; Neuromuscular re-education; Manual Therapy - Soft Tissue Mobilization; Manual Therapy - Joint Manipulation; Home exercise program; Modalities; Integrated dry needling; Therapeutic activities     Goals: (Goals have been discussed and agreed upon with patient.)    Discharge Goals: Time Frame: 6 weeks  Pt will report absence of R shoulder abnormal sensation for a week to improve symptoms. Pt. Will demonstrate 4+/5 MMT for B middle trapezius and lower trapezius musculature to improve strength.   Pt. Will be independent with HEP to prevent return of symptoms  Pt. Will score 0% on the NDI to demonstrate improved pain and function. Outcome Measure: Tool Used: Neck Disability Index (NDI)  Score:  Initial: 5/50  Most Recent: X/50 (Date: -- )   Interpretation of Score: The Neck Disability Index is a revised form of the Oswestry Low Back Pain Index and is designed to measure the activities of daily living in person's with neck pain. Each section is scored on a 0-5 scale, 5 representing the greatest disability. The scores of each section are added together for a total score of 50. Medical Necessity:   > Patient is expected to demonstrate progress in strength and range of motion to increase independence with ADL's and recreational activities. .  Reason For Services/Other Comments:  Patient will benefit from skilled PT to address impairments identified through evaluation and return to previous ADL and recreational capacity. Total Duration:  Time In: 1430  Time Out: 1530    Regarding Tad Alfredayolanda Castano's therapy, I certify that the treatment plan above will be carried out by a therapist or under their direction.   Thank you for this referral,  Morenita Monroe PT     Referring Physician Signature: Cristal Garcia MD                    Post Session Pain  Charge Capture  PT Visit Info MD Guidelines  Shmuel

## 2022-09-29 ENCOUNTER — APPOINTMENT (OUTPATIENT)
Dept: PHYSICAL THERAPY | Age: 82
End: 2022-09-29
Payer: MEDICARE

## 2022-10-04 ENCOUNTER — HOSPITAL ENCOUNTER (OUTPATIENT)
Dept: PHYSICAL THERAPY | Age: 82
Setting detail: RECURRING SERIES
Discharge: HOME | End: 2022-10-07
Payer: MEDICARE

## 2022-10-04 PROCEDURE — 97110 THERAPEUTIC EXERCISES: CPT

## 2022-10-04 PROCEDURE — 97140 MANUAL THERAPY 1/> REGIONS: CPT

## 2022-10-04 ASSESSMENT — PAIN SCALES - GENERAL: PAINLEVEL_OUTOF10: 4

## 2022-10-04 NOTE — PROGRESS NOTES
Tad Castano  : 1940  Primary: Celina Salazar [employer]  Secondary:  [address] @ [address]  [address] [place] [address]  Phone: [phone]  Fax: [phone] Plan Frequency: 2x a week for 90 days    Plan of Care/Certification Expiration Date: 22      PT Visit Info:  No data recorded   Visit Count:  2   OUTPATIENT PHYSICAL THERAPY:OP NOTE TYPE: Treatment Note 10/4/2022       Episode  }Appt Desk             Treatment Diagnosis:  Thoracic Radiculopathy [M54.14]; Strain of Muscle and Tendon of Rotator Cuff, Sequela [S46.011S]; Medical/Referring Diagnosis:  Thoracic radiculopathy [M54.14]  Referring Physician:  Jd Don MD MD Orders:  PT Eval and Treat   Date of Onset:  Onset Date: 22     Allergies:   Amlodipine, Amlodipine-olmesartan, Atorvastatin, Lisinopril, and Penicillins  Restrictions/Precautions:  Restrictions/Precautions: None  No data recorded     Interventions Planned (Treatment may consist of any combination of the following):    Current Treatment Recommendations: Strengthening; ROM; Endurance training; Neuromuscular re-education; Manual Therapy - Soft Tissue Mobilization; Manual Therapy - Joint Manipulation; Home exercise program; Modalities; Integrated dry needling; Therapeutic activities     Subjective Comments: Pt. Reports symptoms remain intermittent mostly in the armpit. Initial:}    4/10Post Session:       4/10  Medications Last Reviewed:  10/4/2022  Updated Objective Findings:  None Today  Treatment   THERAPEUTIC EXERCISE: (15 minutes):    Exercises per grid below to improve mobility and strength. Required minimal visual, verbal, and manual cues to promote proper body alignment, promote proper body posture, and promote proper body mechanics. Progressed resistance, range, and repetitions as indicated.    Date:  10/4/2022   Activity/Exercise Parameters   Chest stretch 3 minutes   Latissimus stretch 4 minutes   Scapular retraction (green band) 3 x 15   Shoulder ER (1#) 3 x 10   Scalene stretch 3 minutes             Manual Therapy (30  Minutes): Manual techniques to facilitate improved motion and decrease pain. (Used abbreviations; PNF - proprioceptive neuromuscular facilitation; a/p - anterior to posterior; p/a - posterior to anterior; cpa - central posterior anterior; upa -unilateral posterior anterior; SAL- superior anterior lateral glide; HVLAT - High Velocity Low Amplitude Thrust)  Soft tissue mobilizaiton: R lateral scapular musculature, subscapularis, upper trapezius, pectoralis  Manual cervical traction  Joint mobilization: cervical spine SAL grade III    Treatment/Session Summary:    Treatment Assessment: Pt. Tolerates manual therapy today with mild pain complaints and minimal post treatment soreness. Pain in the armpit region remains post treatment today. Communication/Consultation:  None today  Equipment provided today:  None  Recommendations/Intent for next treatment session: Next visit will focus on soft tissue mobilization and manual therapy.     Total Treatment Billable Duration:  45 minutes total time   Time In: 1430  Time Out: [place] [address] Gams, PT       Charge Capture  }Post Session Pain  PT Visit Info  MedBridge Portal  MD Guidelines  Scanned Media  Benefits  MyChart    Future Appointments   Date Time Provider Sydni Cordon   10/6/2022  2:30 PM Dayton Myrick, PT [facility]   10/10/2022  2:30 PM Dayton Myrick, PT [facility]   10/20/2022  2:30 PM Dayton Myrick, PT [facility]   10/25/2022  2:30 PM Dayton Myrick, PT [facility]   10/27/2022  2:30 PM Dayton Myrick PT [facility]   12/29/2022 11:20 AM Piper Wallace MD [facility]   3/15/2023 11:00 AM Piper Wallace MD [facility]

## 2022-10-06 ENCOUNTER — HOSPITAL ENCOUNTER (OUTPATIENT)
Dept: PHYSICAL THERAPY | Age: 82
Setting detail: RECURRING SERIES
Discharge: HOME | End: 2022-10-09
Payer: MEDICARE

## 2022-10-06 PROCEDURE — 97140 MANUAL THERAPY 1/> REGIONS: CPT

## 2022-10-06 PROCEDURE — 97110 THERAPEUTIC EXERCISES: CPT

## 2022-10-06 ASSESSMENT — PAIN SCALES - GENERAL: PAINLEVEL_OUTOF10: 4

## 2022-10-06 NOTE — PROGRESS NOTES
Tad Castano  : 1940  Primary: Karyn Olmos [employer] Hmo  Secondary:  [address] @ [address] [address]  Phone: [phone]  Fax: [phone] Plan Frequency: 2x a week for 90 days    Plan of Care/Certification Expiration Date: 22      PT Visit Info:  No data recorded   Visit Count:  3   OUTPATIENT PHYSICAL THERAPY:OP NOTE TYPE: Treatment Note 10/6/2022       Episode  }Appt Desk             Treatment Diagnosis:  Thoracic Radiculopathy [M54.14]; Strain of Muscle and Tendon of Rotator Cuff, Sequela [S46.011S]; Medical/Referring Diagnosis:  Thoracic radiculopathy [M54.14]  Referring Physician:  Odalys Lu MD MD Orders:  PT Eval and Treat   Date of Onset:  Onset Date: 22     Allergies:   Amlodipine, Amlodipine-olmesartan, Atorvastatin, Lisinopril, and Penicillins  Restrictions/Precautions:  Restrictions/Precautions: None  No data recorded     Interventions Planned (Treatment may consist of any combination of the following):    Current Treatment Recommendations: Strengthening; ROM; Endurance training; Neuromuscular re-education; Manual Therapy - Soft Tissue Mobilization; Manual Therapy - Joint Manipulation; Home exercise program; Modalities; Integrated dry needling; Therapeutic activities     Subjective Comments: Pt. Notes continued tightness in the armpit with itch around the area when touched. Initial:}    4/10Post Session:       4/10  Medications Last Reviewed:  10/6/2022  Updated Objective Findings:   Tender to palpation of the R latissimus dorsi  Treatment   THERAPEUTIC EXERCISE: (15 minutes):    Exercises per grid below to improve mobility and strength. Required minimal visual, verbal, and manual cues to promote proper body alignment, promote proper body posture, and promote proper body mechanics. Progressed resistance, range, and repetitions as indicated.    Date:  10/6/2022   Activity/Exercise Parameters   Chest stretch 3 minutes   Latissimus stretch 4 minutes   Scapular retraction (green band) 3 x 15   Shoulder ER (1#) --   Scalene stretch 3 minutes   Prone scapular retraction 3 x 10         Manual Therapy (25  Minutes): Manual techniques to facilitate improved motion and decrease pain. (Used abbreviations; PNF - proprioceptive neuromuscular facilitation; a/p - anterior to posterior; p/a - posterior to anterior; cpa - central posterior anterior; upa -unilateral posterior anterior; SAL- superior anterior lateral glide; HVLAT - High Velocity Low Amplitude Thrust)  Soft tissue mobilizaiton: R lateral scapular musculature, subscapularis, upper trapezius, pectoralis  Manual cervical traction  Joint mobilization: cervical spine SAL grade III    Dry needling (5 minutes): R latissimus dorsi, infraspinatus, teres major, teres minor  6 needles in 6 needles out    Treatment/Session Summary:    Treatment Assessment: Pt. Is initiated on dry needling techniques today to address myofascial pain in the lateral scapular region. Pt. Tolerates dry needling today without complaints. Pt. Continues to demonstrate moderate flexibility limitations in the cervical and thoracic region. Communication/Consultation:  None today  Equipment provided today:  None  Recommendations/Intent for next treatment session: Next visit will focus on soft tissue mobilization and manual therapy.     Total Treatment Billable Duration:  45 minutes total time   Time In: 1430  Time Out: [address] PT       Charge Capture  }Post Session Pain  PT Visit Info  MedBridge Portal  MD Guidelines  Scanned Media  Benefits  MyChart    Future Appointments   Date Time Provider Sydni Cordon   10/10/2022  2:30 PM Fortunato Good PT [facility]   10/20/2022  2:30 PM Fortunato Good PT [facility]   10/25/2022  2:30 PM Fortunato Good PT [facility]   10/27/2022  2:30 PM Fortunato Good PT [facility]   12/29/2022 11:20 AM Rylan Jones MD [facility]   3/15/2023 11:00 AM Keisha Gamboa MD [place] [facility]

## 2022-10-10 ENCOUNTER — HOSPITAL ENCOUNTER (OUTPATIENT)
Dept: PHYSICAL THERAPY | Age: 82
Setting detail: RECURRING SERIES
Discharge: HOME | End: 2022-10-13
Payer: MEDICARE

## 2022-10-10 PROCEDURE — 97110 THERAPEUTIC EXERCISES: CPT

## 2022-10-10 PROCEDURE — 97140 MANUAL THERAPY 1/> REGIONS: CPT

## 2022-10-10 ASSESSMENT — PAIN SCALES - GENERAL: PAINLEVEL_OUTOF10: 4

## 2022-10-10 NOTE — PROGRESS NOTES
Tad Castano  : 1940  Primary: Aurelia Liriano [employer]vant[employer] Hmo  Secondary:  [address] @ [address]  [address] [place] [address]  Phone: [phone]  Fax: [phone] Plan Frequency: 2x a week for 90 days    Plan of Care/Certification Expiration Date: 22      PT Visit Info:  No data recorded   Visit Count:  4   OUTPATIENT PHYSICAL THERAPY:OP NOTE TYPE: Treatment Note 10/10/2022       Episode  }Appt Desk             Treatment Diagnosis:  Thoracic Radiculopathy [M54.14]; Strain of Muscle and Tendon of Rotator Cuff, Sequela [S46.011S]; Medical/Referring Diagnosis:  Thoracic radiculopathy [M54.14]  Referring Physician:  Moriah Hoffmann MD MD Orders:  PT Eval and Treat   Date of Onset:  Onset Date: 22     Allergies:   Amlodipine, Amlodipine-olmesartan, Atorvastatin, Lisinopril, and Penicillins  Restrictions/Precautions:  Restrictions/Precautions: None  No data recorded     Interventions Planned (Treatment may consist of any combination of the following):    Current Treatment Recommendations: Strengthening; ROM; Endurance training; Neuromuscular re-education; Manual Therapy - Soft Tissue Mobilization; Manual Therapy - Joint Manipulation; Home exercise program; Modalities; Integrated dry needling; Therapeutic activities     Subjective Comments: Pt. Reports some improvement in symptoms but armpit pull/pain remains. Initial:}    4/10Post Session:       4/10  Medications Last Reviewed:  10/10/2022  Updated Objective Findings:  None Today  Treatment   THERAPEUTIC EXERCISE: (15 minutes):    Exercises per grid below to improve mobility and strength. Required minimal visual, verbal, and manual cues to promote proper body alignment, promote proper body posture, and promote proper body mechanics. Progressed resistance, range, and repetitions as indicated.    Date:  10/10/2022   Activity/Exercise Parameters   Chest stretch 3 minutes   Latissimus stretch 4 minutes Scapular retraction (green band) 3 x 15   Shoulder ER (1#) --   Scalene stretch 3 minutes   Prone scapular retraction --   Shoulder extension (orange band) 3 x 10     Manual Therapy (25  Minutes): Manual techniques to facilitate improved motion and decrease pain. (Used abbreviations; PNF - proprioceptive neuromuscular facilitation; a/p - anterior to posterior; p/a - posterior to anterior; cpa - central posterior anterior; upa -unilateral posterior anterior; SAL- superior anterior lateral glide; HVLAT - High Velocity Low Amplitude Thrust)  Soft tissue mobilizaiton: R lateral scapular musculature, subscapularis, upper trapezius, pectoralis  Manual cervical traction  Joint mobilization: cervical spine SAL grade III    Dry needling (5 minutes): R latissimus dorsi, infraspinatus, teres major, teres minor  6 needles in 6 needles out    Treatment/Session Summary:    Treatment Assessment: Pt. Demonstrates reduced myofascial trigger points in the R latissimus dorsi today. Pt. Will benefit from continued manual therapy in the cervical and scapular region to address continued pain. Communication/Consultation:  None today  Equipment provided today:  None  Recommendations/Intent for next treatment session: Next visit will focus on soft tissue mobilization and manual therapy.     Total Treatment Billable Duration:  45 minutes total time   Time In: 1430  Time Out: [address] Gams, PT       Charge Capture  }Post Session Pain  PT Visit Info  [employer] Portal  MD Guidelines  Scanned Media  Benefits  MyChart    Future Appointments   Date Time Provider Sydni Cordon   10/20/2022  2:30 PM Leslie Hubbard [facility]   10/25/2022  2:30 PM Leslie Hubbard PT OFF [facility]   10/27/2022  2:30 PM Leslie Hubbard PT [facility]note 3[facility]   12/29/2022 11:20 AM Fabienne Perkins MD [facility]   3/15/2023 11:00 AM Fabienne Perkins MD [facility]

## 2022-10-20 ENCOUNTER — HOSPITAL ENCOUNTER (OUTPATIENT)
Dept: PHYSICAL THERAPY | Age: 82
Setting detail: RECURRING SERIES
Discharge: HOME | End: 2022-10-23
Payer: MEDICARE

## 2022-10-20 PROCEDURE — 97140 MANUAL THERAPY 1/> REGIONS: CPT

## 2022-10-20 PROCEDURE — 97110 THERAPEUTIC EXERCISES: CPT

## 2022-10-20 ASSESSMENT — PAIN SCALES - GENERAL: PAINLEVEL_OUTOF10: 3

## 2022-10-20 NOTE — PROGRESS NOTES
Tad Castano  : 1940  Primary: [facility] Medicare Advantage Hmo  Secondary:  [address] @ [address]  [address] [place] [address]  Phone: [phone]  Fax: [phone] Plan Frequency: 2x a week for 90 days    Plan of Care/Certification Expiration Date: 22      PT Visit Info:  No data recorded   Visit Count:  5   OUTPATIENT PHYSICAL THERAPY:OP NOTE TYPE: Treatment Note 10/20/2022       Episode  }Appt Desk             Treatment Diagnosis:  Thoracic Radiculopathy [M54.14]; Strain of Muscle and Tendon of Rotator Cuff, Sequela [S46.011S]; Medical/Referring Diagnosis:  Thoracic radiculopathy [M54.14]  Referring Physician:  Darrin Raymundo MD MD Orders:  PT Eval and Treat   Date of Onset:  Onset Date: 22     Allergies:   Amlodipine, Amlodipine-olmesartan, Atorvastatin, Lisinopril, and Penicillins  Restrictions/Precautions:  Restrictions/Precautions: None  No data recorded     Interventions Planned (Treatment may consist of any combination of the following):    Current Treatment Recommendations: Strengthening; ROM; Endurance training; Neuromuscular re-education; Manual Therapy - Soft Tissue Mobilization; Manual Therapy - Joint Manipulation; Home exercise program; Modalities; Integrated dry needling; Therapeutic activities     Subjective Comments: Pt. Notes symptoms are now intermittent. Initial:}    3/10Post Session:       3/10  Medications Last Reviewed:  10/20/2022  Updated Objective Findings:   mild tenderness to palpation of the R latissimus dorsi muscle  Treatment   THERAPEUTIC EXERCISE: (20 minutes):    Exercises per grid below to improve mobility and strength. Required minimal visual, verbal, and manual cues to promote proper body alignment, promote proper body posture, and promote proper body mechanics. Progressed resistance, range, and repetitions as indicated.    Date:  10/20/2022   Activity/Exercise Parameters   Chest stretch 3 minutes Latissimus stretch --   Scapular retraction (green band) 3 x 15   Shoulder ER (1#) --   Scalene stretch 3 minutes   Prone scapular retraction 3 x 10   Shoulder extension (orange band) 3 x 10     Manual Therapy (25  Minutes): Manual techniques to facilitate improved motion and decrease pain. (Used abbreviations; PNF - proprioceptive neuromuscular facilitation; a/p - anterior to posterior; p/a - posterior to anterior; cpa - central posterior anterior; upa -unilateral posterior anterior; SAL- superior anterior lateral glide; HVLAT - High Velocity Low Amplitude Thrust)  Soft tissue mobilizaiton: R lateral scapular musculature, subscapularis, upper trapezius, pectoralis  Manual cervical traction  Joint mobilization: cervical spine SAL grade III    Dry needling (5 minutes): R latissimus dorsi, infraspinatus, teres major, teres minor  6 needles in 6 needles out    Treatment/Session Summary:    Treatment Assessment: Pt. Demonstrates less tenderness to palpation of the R lateral scapular region. Pt. Will benefit from increased flexibility and strengthening exercises in the upper quarter to prevent return of symptoms. Communication/Consultation:  None today  Equipment provided today:  None  Recommendations/Intent for next treatment session: Next visit will focus on soft tissue mobilization and manual therapy.     Total Treatment Billable Duration:  50 minutes total time   Time In: 1430  Time Out: [address], PT       Charge Capture  }Post Session Pain  PT Visit Info  MedBridge Portal  MD Guidelines  Scanned Media  Benefits  MyChart    Future Appointments   Date Time Provider Sydni Cordon   10/25/2022  2:30 PM Elodia Villarreal PT [facility]   10/27/2022  2:30 PM Elodia Villarreal PT [facility]   12/29/2022 11:20 AM Mehran Saini MD [facility]   3/15/2023 11:00 AM Mehran Saini MD [facility]

## 2022-10-25 ENCOUNTER — HOSPITAL ENCOUNTER (OUTPATIENT)
Dept: PHYSICAL THERAPY | Age: 82
Setting detail: RECURRING SERIES
Discharge: HOME | End: 2022-10-28
Payer: MEDICARE

## 2022-10-25 PROCEDURE — 97140 MANUAL THERAPY 1/> REGIONS: CPT

## 2022-10-25 PROCEDURE — 97110 THERAPEUTIC EXERCISES: CPT

## 2022-10-25 ASSESSMENT — PAIN SCALES - GENERAL: PAINLEVEL_OUTOF10: 2

## 2022-10-25 NOTE — PROGRESS NOTES
Tad Castano  : 1940  Primary: Aurelia Liriano [employer]  Secondary:  [address] @ [address]  [address] [place], [place] [address]  Phone: [phone]  Fax: [phone] Plan Frequency: 2x a week for 90 days    Plan of Care/Certification Expiration Date: 22      PT Visit Info:  No data recorded   Visit Count:  6   OUTPATIENT PHYSICAL THERAPY:OP NOTE TYPE: Treatment Note 10/25/2022       Episode  }Appt Desk             Treatment Diagnosis:  Thoracic Radiculopathy [M54.14]; Strain of Muscle and Tendon of Rotator Cuff, Sequela [S46.011S]; Medical/Referring Diagnosis:  Thoracic radiculopathy [M54.14]  Referring Physician:  Moriah Hoffmann MD MD Orders:  PT Eval and Treat   Date of Onset:  Onset Date: 22     Allergies:   Amlodipine, Amlodipine-olmesartan, Atorvastatin, Lisinopril, and Penicillins  Restrictions/Precautions:  Restrictions/Precautions: None  No data recorded     Interventions Planned (Treatment may consist of any combination of the following):    Current Treatment Recommendations: Strengthening; ROM; Endurance training; Neuromuscular re-education; Manual Therapy - Soft Tissue Mobilization; Manual Therapy - Joint Manipulation; Home exercise program; Modalities; Integrated dry needling; Therapeutic activities     Subjective Comments: Pt. Reports moderate muscle soreness from exercises last session. Symptoms seem to be improving. Initial:}    2/10Post Session:       2/10  Medications Last Reviewed:  10/25/2022  Updated Objective Findings:  None Today  Treatment   THERAPEUTIC EXERCISE: (20 minutes):    Exercises per grid below to improve mobility and strength. Required minimal visual, verbal, and manual cues to promote proper body alignment, promote proper body posture, and promote proper body mechanics. Progressed resistance, range, and repetitions as indicated.    Date:  10/25/2022   Activity/Exercise Parameters   Chest stretch 3 minutes Latissimus stretch 3 minutes   Scapular retraction (green band) 3 x 15   Shoulder ER (1#) --   Scalene stretch 3 minutes   Prone scapular retraction 3 x 10   Shoulder extension (orange band) 3 x 10     Manual Therapy (25  Minutes): Manual techniques to facilitate improved motion and decrease pain. (Used abbreviations; PNF - proprioceptive neuromuscular facilitation; a/p - anterior to posterior; p/a - posterior to anterior; cpa - central posterior anterior; upa -unilateral posterior anterior; SAL- superior anterior lateral glide; HVLAT - High Velocity Low Amplitude Thrust)  Soft tissue mobilizaiton: R lateral scapular musculature, subscapularis, upper trapezius, pectoralis  Manual cervical traction  Joint mobilization: cervical spine SAL grade III    Dry needling (5 minutes): R latissimus dorsi, infraspinatus, teres major, teres minor  6 needles in 6 needles out    Treatment/Session Summary:    Treatment Assessment: Pt. Tolerates dry needling today without complaints and minimal post treatment soreness. Palpation of the R lateral scapular musculature continues to improve. Communication/Consultation:  None today  Equipment provided today:  None  Recommendations/Intent for next treatment session: Next visit will focus on soft tissue mobilization and manual therapy.     Total Treatment Billable Duration:  50 minutes total time   Time In: 1430  Time Out: [address], PT       Charge Capture  }Post Session Pain  PT Visit Info  MedBridge Portal  MD Guidelines  Scanned Media  Benefits  MyChart    Future Appointments   Date Time Provider Sydni Cordon   10/27/2022  2:30 PM Susanne Reid PT [facility]   11/2/2022 11:30 AM Susanne Reid PT [facility]   11/22/2022  2:30 PM Susanne Reid PT [facility]   11/29/2022  2:30 PM Susanne Reid PT [facility]   12/29/2022 11:20 AM Sunni Cornejo MD [facility]   3/15/2023 11:00 AM Sunni Cornejo MD [facility]

## 2022-10-27 ENCOUNTER — HOSPITAL ENCOUNTER (OUTPATIENT)
Dept: PHYSICAL THERAPY | Age: 82
Setting detail: RECURRING SERIES
Discharge: HOME | End: 2022-10-30
Payer: MEDICARE

## 2022-10-27 PROCEDURE — 97140 MANUAL THERAPY 1/> REGIONS: CPT

## 2022-10-27 PROCEDURE — 97110 THERAPEUTIC EXERCISES: CPT

## 2022-10-27 ASSESSMENT — PAIN SCALES - GENERAL: PAINLEVEL_OUTOF10: 2

## 2022-10-27 NOTE — PROGRESS NOTES
Tad Castano  : 1940  Primary: Cait Higuera [employer]  Secondary:  [address] @ [address] [address]  Phone: [phone]  Fax: [phone] Plan Frequency: 2x a week for 90 days    Plan of Care/Certification Expiration Date: 22      PT Visit Info:  No data recorded   Visit Count:  7   OUTPATIENT PHYSICAL THERAPY:OP NOTE TYPE: Treatment Note 10/27/2022       Episode  }Appt Desk             Treatment Diagnosis:  Thoracic Radiculopathy [M54.14]; Strain of Muscle and Tendon of Rotator Cuff, Sequela [S46.011S]; Medical/Referring Diagnosis:  Thoracic radiculopathy [M54.14]  Referring Physician:  Nilay Tom MD MD Orders:  PT Eval and Treat   Date of Onset:  Onset Date: 22     Allergies:   Amlodipine, Amlodipine-olmesartan, Atorvastatin, Lisinopril, and Penicillins  Restrictions/Precautions:  Restrictions/Precautions: None  No data recorded     Interventions Planned (Treatment may consist of any combination of the following):    Current Treatment Recommendations: Strengthening; ROM; Endurance training; Neuromuscular re-education; Manual Therapy - Soft Tissue Mobilization; Manual Therapy - Joint Manipulation; Home exercise program; Modalities; Integrated dry needling; Therapeutic activities     Subjective Comments: Pt. Notes continues improvement in R back and shoulder pain. Initial:}    2/10Post Session:       2/10  Medications Last Reviewed:  10/27/2022  Updated Objective Findings:   absence of tenderness to palpation of the right latissimus muscle  Treatment   THERAPEUTIC EXERCISE: (30 minutes):    Exercises per grid below to improve mobility and strength. Required minimal visual, verbal, and manual cues to promote proper body alignment, promote proper body posture, and promote proper body mechanics. Progressed resistance, range, and repetitions as indicated.    Date:  10/27/2022   Activity/Exercise Parameters   Chest stretch 3 minutes   Latissimus stretch 3 minutes   Scapular retraction (green band) 3 x 15   Shoulder ER (1#) 3 x 10   Scalene stretch 3 minutes   Prone scapular retraction 3 x 10   Shoulder extension (orange band) 3 x 10   PNF D2 Extension 3 x 10     Manual Therapy (15  Minutes): Manual techniques to facilitate improved motion and decrease pain. (Used abbreviations; PNF - proprioceptive neuromuscular facilitation; a/p - anterior to posterior; p/a - posterior to anterior; cpa - central posterior anterior; upa -unilateral posterior anterior; SAL- superior anterior lateral glide; HVLAT - High Velocity Low Amplitude Thrust)  Soft tissue mobilizaiton: R lateral scapular musculature, subscapularis, upper trapezius, pectoralis  Manual cervical traction  Joint mobilization: cervical spine SAL grade III    Dry needling (5 minutes): R latissimus dorsi, infraspinatus, teres major, teres minor  6 needles in 6 needles out    Treatment/Session Summary:    Treatment Assessment: As pain continues to improve, pt. Will benefit from advancement of scapular and shoulder strengthening. Palpation of the right lateral scapular musculature and latissimus musculature continues to improve with manual therapy. Communication/Consultation:  None today  Equipment provided today:  None  Recommendations/Intent for next treatment session: Next visit will focus on soft tissue mobilization and manual therapy.     Total Treatment Billable Duration:  50 minutes total time   Time In: 1430  Time Out: 410 [address], PT       Charge Capture  }Post Session Pain  PT Visit Info  MedBridge Portal  MD Guidelines  Scanned Media  Benefits  MyChart    Future Appointments   Date Time Provider Sydni Cordon   11/2/2022 11:30 AM Helen Pfeiffer PT [facility]   11/22/2022  2:30 PM Helen Pfeiffer PT [facility]   11/29/2022  2:30 PM Helen Pfeiffer PT [facility]   12/29/2022 11:20 AM Ute Dominguez MD [facility]   3/15/2023 11:00 AM Ute Dominguez MD [facility]

## 2022-11-02 ENCOUNTER — HOSPITAL ENCOUNTER (OUTPATIENT)
Dept: PHYSICAL THERAPY | Age: 82
Setting detail: RECURRING SERIES
Discharge: HOME | End: 2022-11-05
Payer: MEDICARE

## 2022-11-02 PROCEDURE — 97140 MANUAL THERAPY 1/> REGIONS: CPT

## 2022-11-02 PROCEDURE — 97110 THERAPEUTIC EXERCISES: CPT

## 2022-11-02 ASSESSMENT — PAIN SCALES - GENERAL: PAINLEVEL_OUTOF10: 2

## 2022-11-02 NOTE — PROGRESS NOTES
Tad Castano  : 1940  Primary: Ashley Mcwilliams [employer]  Secondary:  [address] @ [address]  [address] [place] [address]  Phone: [phone]  Fax: [phone] Plan Frequency: 2x a week for 90 days    Plan of Care/Certification Expiration Date: 22      PT Visit Info:  No data recorded   Visit Count:  8   OUTPATIENT PHYSICAL THERAPY:OP NOTE TYPE: Treatment Note 2022       Episode  }Appt Desk             Treatment Diagnosis:  Thoracic Radiculopathy [M54.14]; Strain of Muscle and Tendon of Rotator Cuff, Sequela [S46.011S]; Medical/Referring Diagnosis:  Thoracic radiculopathy [M54.14]  Referring Physician:  Nixon Garcia MD MD Orders:  PT Eval and Treat   Date of Onset:  Onset Date: 22     Allergies:   Amlodipine, Amlodipine-olmesartan, Atorvastatin, Lisinopril, and Penicillins  Restrictions/Precautions:  Restrictions/Precautions: None  No data recorded     Interventions Planned (Treatment may consist of any combination of the following):    Current Treatment Recommendations: Strengthening; ROM; Endurance training; Neuromuscular re-education; Manual Therapy - Soft Tissue Mobilization; Manual Therapy - Joint Manipulation; Home exercise program; Modalities; Integrated dry needling; Therapeutic activities     Subjective Comments: Pt. Reports continues improvement of symptoms. Pt. Notes one incidence of abnormal sensation in the R armpit region. Initial:}    2/10Post Session:       2/10  Medications Last Reviewed:  2022  Updated Objective Findings:  None Today  Treatment   THERAPEUTIC EXERCISE: (30 minutes):    Exercises per grid below to improve mobility and strength. Required minimal visual, verbal, and manual cues to promote proper body alignment, promote proper body posture, and promote proper body mechanics. Progressed resistance, range, and repetitions as indicated.    Date:  2022   Activity/Exercise Parameters   Chest stretch 3 minutes   Latissimus stretch 3 minutes   Scapular retraction (green band) 3 x 15   Shoulder ER (1#) 3 x 10   Scalene stretch 3 minutes   Prone scapular retraction --   Shoulder extension (orange band) 3 x 10   PNF D2 Extension 3 x 10         Manual Therapy (15  Minutes): Manual techniques to facilitate improved motion and decrease pain. (Used abbreviations; PNF - proprioceptive neuromuscular facilitation; a/p - anterior to posterior; p/a - posterior to anterior; cpa - central posterior anterior; upa -unilateral posterior anterior; SAL- superior anterior lateral glide; HVLAT - High Velocity Low Amplitude Thrust)  Soft tissue mobilizaiton: R lateral scapular musculature, subscapularis, upper trapezius, pectoralis  Manual cervical traction  Joint mobilization: cervical spine SAL grade III    Dry needling (5 minutes): R latissimus dorsi, infraspinatus, teres major, teres minor  6 needles in 6 needles out    Treatment/Session Summary:    Treatment Assessment: Pt. Continues to demonstrate less pain and improved soft tissue mobility in the R shoulder region. Pt. Tolerates therapeutic exercises today without complaints. Communication/Consultation:  None today  Equipment provided today:  None  Recommendations/Intent for next treatment session: Next visit will focus on soft tissue mobilization and manual therapy.     Total Treatment Billable Duration:  50 minutes total time   Time In: 1728  Time Out: 43[address] Alan Arias PT       Charge Capture  }Post Session Pain  PT Visit Info  MedBridge Portal  MD Guidelines  Scanned Media  Benefits  MyChart    Future Appointments   Date Time Provider Sydni Cordon   11/22/2022  2:30 PM Gissel Harris PT [facility]   11/29/2022  2:30 PM Gissel Harris PT [facility]   12/29/2022 11:20 AM Tonja Decker MD [facility]   3/15/2023 11:00 AM Tonja Decker MD [facility]

## 2022-11-22 ENCOUNTER — HOSPITAL ENCOUNTER (OUTPATIENT)
Dept: PHYSICAL THERAPY | Age: 82
Setting detail: RECURRING SERIES
Discharge: HOME | End: 2022-11-25
Payer: MEDICARE

## 2022-11-22 PROCEDURE — 97140 MANUAL THERAPY 1/> REGIONS: CPT

## 2022-11-22 PROCEDURE — 97110 THERAPEUTIC EXERCISES: CPT

## 2022-11-22 ASSESSMENT — PAIN SCALES - GENERAL: PAINLEVEL_OUTOF10: 0

## 2022-11-22 NOTE — THERAPY DISCHARGE
Tad Castano  : 1940  Primary: Irena Riley [employer] Hmo  Secondary:  [address] @ [address] [address]  Phone: [phone]  Fax: [phone] Plan Frequency: 2x a week for 90 days  Plan of Care/Certification Expiration Date: 22      PT Visit Info:         Visit Count:  9    OUTPATIENT PHYSICAL EBXWVWK:II NOTE TYPE: Discharge Summary 2022               Episode  Appt Desk         Treatment Diagnosis:  Thoracic Radiculopathy [M54.14]; Strain of Muscle and Tendon of Rotator Cuff, Sequela [S46.011S]; Medical/Referring Diagnosis:  Thoracic radiculopathy [M54.14]  Referring Physician:  Eric Perez MD MD Orders:  PT Eval and Treat   Return MD Appt:  TBD  Date of Onset:  Onset Date: 22     Allergies:  Amlodipine, Amlodipine-olmesartan, Atorvastatin, Lisinopril, and Penicillins  Restrictions/Precautions:    Restrictions/Precautions: None      Medications Last Reviewed:  2022     SUBJECTIVE   History of Injury/Illness (Reason for Referral):  Pt. Attends PT c/o R posterior shoulder tingling, burning, itching with gradual insidious onset the beginning of September. Pt. Notes she was trimming bushes which may have started symptoms. The abnormal sensation is located along the lateral shoulder blade, armpit, posterior arm. Pt. Notes symptoms increased while sleeping at night that will wake her up. Intensity comes and goes but is mostly there. Pt. Denies neck pain, weakness, etc.  Pt. Has been cleared of any heart complications. Pt. Would like to address remaining abnormal sensation.    Patient Stated Goal(s):  \"get rid of abnormal sensation in the shoulder\"  Initial:      /10 Post Session:      /10  Past Medical History/Comorbidities:   Ms. Tad Fuchs  has a past medical history of Abnormal EKG, Arthritis, Chest pain, Chronic kidney disease, stage 3 ([address] Chronic pain, Elevated cholesterol, Hormone replacement therapy (HRT), Hypertension, Macular degeneration, Mixed hyperlipidemia, Obesity, Psoriasis, and Renal insufficiency. Ms. Lucien Lockett  has a past surgical history that includes Total knee arthroplasty (Left, 2012); Cholecystectomy (1999); lap,cholecystectomy; Hysterectomy (1972); Ovary removal (1975); Cholecystectomy (2000); Tonsillectomy (1940's); and heent (1/3/12). OBJECTIVE   Observation:       Palpation:  Upper Trapezius: tender Levator Scapulae: Posterior Cervical   Sternocleidomastoid: Subscapularis: tender Lateral scapular muscles: tender       Flexibility:  limited pectoralis, upper trapezius latissimus dorsi      Range of Motion: Cervical in degrees  Flexion                 40                     Extension 40                      Right                     Left   Rotation 52 54   Side Bend 20 20     Thoracic flexion, sidebend, rotation WNL and no increase in symptoms  Central P/A to lower cervical and thoracic spine hypomobile, no changes in abnormal sensation    Strength: Manual Muscle Test: upper quarter screen 5/5 MMT, middle and lower trapezius 5/5 MMT      Special Testing:  Spurlings Right: (-) Left: (-)   Upper limb Tension Test (ULTT1)     Cervical Distraction (-) (-)   Cervical flexion rotation test     Other:       Neuro: UE reflexes 1+  ASSESSMENT   Initial Assessment:  Upon PT assessment, subjective symptoms appear radicular in nature for the R scapular region but provocative testing in the cervical and thoracic spine fails to reproduce or reduce subjective complaints. Pt. Demonstrates moderate myofascial tenderness to palpation of the R infraspinatus, teres major, teres minor, and latissimus dorsi musculature. Soft tissue mobilization to this region improves reported symptoms today. Pt. Demonstrates flexibility limitations in the pectoralis and latissimus dorsi musculature,  Mild strength deficits are present in the scapular region.   Pt. Will benefit from manual therapy to address myofascial pain along with mobility exercises and manual cervical traction to address potential radicular symptosm. 11/22/22 Discharge Summary: Pt. Attends 9 physical therapy sessions. Pt. Kuldip Petersenfast all set goals since initial evaluation. Subjective pain and abnormal sensation remains improved and patient is confident to discharge at this time with HEP. Pt. Is advised to f/u with PT is symptoms return. Problem List: (Impacting functional limitations): Body Structures, Functions, Activity Limitations Requiring Skilled Therapeutic Intervention: Decreased functional mobility ; Decreased ROM; Decreased strength; Decreased endurance; Increased pain; Decreased posture     Therapy Prognosis:   Therapy Prognosis: Excellent          PLAN   Effective Dates: 11/22/2022 TO Plan of Care/Certification Expiration Date: 12/26/22     Frequency/Duration: Plan Frequency: 2x a week for 90 days     Interventions Planned (Treatment may consist of any combination of the following):    Current Treatment Recommendations: Strengthening; ROM; Endurance training; Neuromuscular re-education; Manual Therapy - Soft Tissue Mobilization; Manual Therapy - Joint Manipulation; Home exercise program; Modalities; Integrated dry needling; Therapeutic activities     Goals: (Goals have been discussed and agreed upon with patient.)    Discharge Goals: Time Frame: 6 weeks  Pt will report absence of R shoulder abnormal sensation for a week to improve symptoms. MET  Pt. Will demonstrate 4+/5 MMT for B middle trapezius and lower trapezius musculature to improve strength. MET  Pt. Will be independent with HEP to prevent return of symptoms. MET  Pt. Will score 0% on the NDI to demonstrate improved pain and function. MET         Outcome Measure: Tool Used: Neck Disability Index (NDI)  Score:  Initial: 5/50  Most Recent: 0/50 (Date: 11/22/22 )   Interpretation of Score:  The Neck Disability Index is a revised form of the Oswestry Low Back Pain Index and is designed to measure the activities of daily living in person's with neck pain. Each section is scored on a 0-5 scale, 5 representing the greatest disability. The scores of each section are added together for a total score of 50.        Pallavi Hicks, PT            Post Session Pain  Charge Capture  PT Visit Info MD Guidelines  Shmuel

## 2022-11-22 NOTE — PROGRESS NOTES
Tad Castano  : 1940  Primary: Noemi Rock [employer] Hmo  Secondary:  Tamar Rivers @ [address] [place] [address]  Phone: [phone]  Fax: [phone] Plan Frequency: 2x a week for 90 days    Plan of Care/Certification Expiration Date: 22      PT Visit Info:  No data recorded   Visit Count:  9   OUTPATIENT PHYSICAL TCAZUHV:JW NOTE TYPE: Treatment Note 2022       Episode  }Appt Desk             Treatment Diagnosis:  Thoracic Radiculopathy [M54.14]; Strain of Muscle and Tendon of Rotator Cuff, Sequela [S46.011S]; Medical/Referring Diagnosis:  Thoracic radiculopathy [M54.14]  Referring Physician:  Charlene Mckeon MD MD Orders:  PT Eval and Treat   Date of Onset:  Onset Date: 22     Allergies:   Amlodipine, Amlodipine-olmesartan, Atorvastatin, Lisinopril, and Penicillins  Restrictions/Precautions:  Restrictions/Precautions: None  No data recorded     Interventions Planned (Treatment may consist of any combination of the following):    Current Treatment Recommendations: Strengthening; ROM; Endurance training; Neuromuscular re-education; Manual Therapy - Soft Tissue Mobilization; Manual Therapy - Joint Manipulation; Home exercise program; Modalities; Integrated dry needling; Therapeutic activities     Subjective Comments: Pt. Notes minimal abnormal sensation over the past two weeks. Symptoms are mostly present if she stays in a certain positions for prolonged periods such as reading. Symptoms improve once she adjust posture. Initial:}    0/10Post Session:       0/10  Medications Last Reviewed:  2022  Updated Objective Findings:  See evaluation note from today  Treatment   THERAPEUTIC EXERCISE: (30 minutes):    Exercises per grid below to improve mobility and strength. Required minimal visual, verbal, and manual cues to promote proper body alignment, promote proper body posture, and promote proper body mechanics. Progressed resistance, range, and repetitions as indicated. Date:  11/22/2022   Activity/Exercise Parameters   Chest stretch 3 minutes   Latissimus stretch 3 minutes   Scapular retraction (green band) 50 reps   Shoulder ER (1#) 3 x 10   Scalene stretch 3 minutes   Prone scapular retraction 3 x 10   Shoulder extension (orange band) 3 x 10   PNF D2 Extension --   HEP 3 minutes     Manual Therapy (15  Minutes): Manual techniques to facilitate improved motion and decrease pain. (Used abbreviations; PNF - proprioceptive neuromuscular facilitation; a/p - anterior to posterior; p/a - posterior to anterior; cpa - central posterior anterior; upa -unilateral posterior anterior; SAL- superior anterior lateral glide; HVLAT - High Velocity Low Amplitude Thrust)  Soft tissue mobilizaiton: R lateral scapular musculature, subscapularis, upper trapezius, pectoralis  Manual cervical traction  Joint mobilization: cervical spine SAL grade III        Treatment/Session Summary:    Treatment Assessment: Pt. Continues to demonstrate improved myofascial pain in the R lateral scapular region and arm pit. Pt. Demonstrates appropriate psychomotor knowledge of HEP and is confident to discharge at this time with HEP.         Communication/Consultation:  None today  Equipment provided today:  theraband    Total Treatment Billable Duration:  45 minutes total time   Time In: 1430  Time Out: 1530    Jessica Valera, PT       Charge Capture  }Post Session Pain  PT Visit Info  MedBridge Portal  MD Guidelines  Scanned Media  Benefits  MyChart    Future Appointments   Date Time Provider Sydni Cordon   12/29/2022 11:20 AM Di Barnett MD [facility]   3/15/2023 11:00 AM Di Barnett MD [facility]

## 2022-11-29 ENCOUNTER — APPOINTMENT (OUTPATIENT)
Dept: PHYSICAL THERAPY | Age: 82
End: 2022-11-29
Payer: MEDICARE

## 2022-12-12 ENCOUNTER — NURSE ONLY (OUTPATIENT)
Dept: FAMILY MEDICINE CLINIC | Facility: CLINIC | Age: 82
End: 2022-12-12
Payer: MEDICARE

## 2022-12-12 DIAGNOSIS — Z23 ENCOUNTER FOR IMMUNIZATION: ICD-10-CM

## 2022-12-12 DIAGNOSIS — J11.1 INFLUENZA: Primary | ICD-10-CM

## 2022-12-12 PROCEDURE — 90694 VACC AIIV4 NO PRSRV 0.5ML IM: CPT | Performed by: FAMILY MEDICINE

## 2022-12-12 PROCEDURE — G0008 ADMIN INFLUENZA VIRUS VAC: HCPCS | Performed by: FAMILY MEDICINE

## 2023-01-05 DIAGNOSIS — M10.9 ACUTE GOUT INVOLVING TOE OF RIGHT FOOT, UNSPECIFIED CAUSE: ICD-10-CM

## 2023-01-05 DIAGNOSIS — I10 BENIGN ESSENTIAL HTN: ICD-10-CM

## 2023-01-05 RX ORDER — FUROSEMIDE 20 MG/1
20 TABLET ORAL DAILY
Qty: 90 TABLET | Refills: 1 | Status: SHIPPED | OUTPATIENT
Start: 2023-01-05

## 2023-01-05 RX ORDER — ALLOPURINOL 100 MG/1
100 TABLET ORAL DAILY
Qty: 90 TABLET | Refills: 1 | Status: SHIPPED | OUTPATIENT
Start: 2023-01-05

## 2023-01-05 RX ORDER — OLMESARTAN MEDOXOMIL 40 MG/1
40 TABLET ORAL DAILY
Qty: 90 TABLET | Refills: 1 | Status: SHIPPED | OUTPATIENT
Start: 2023-01-05

## 2023-03-13 SDOH — ECONOMIC STABILITY: FOOD INSECURITY: WITHIN THE PAST 12 MONTHS, THE FOOD YOU BOUGHT JUST DIDN'T LAST AND YOU DIDN'T HAVE MONEY TO GET MORE.: NEVER TRUE

## 2023-03-13 SDOH — ECONOMIC STABILITY: TRANSPORTATION INSECURITY
IN THE PAST 12 MONTHS, HAS LACK OF TRANSPORTATION KEPT YOU FROM MEETINGS, WORK, OR FROM GETTING THINGS NEEDED FOR DAILY LIVING?: NO

## 2023-03-13 SDOH — ECONOMIC STABILITY: INCOME INSECURITY: HOW HARD IS IT FOR YOU TO PAY FOR THE VERY BASICS LIKE FOOD, HOUSING, MEDICAL CARE, AND HEATING?: NOT HARD AT ALL

## 2023-03-13 SDOH — ECONOMIC STABILITY: FOOD INSECURITY: WITHIN THE PAST 12 MONTHS, YOU WORRIED THAT YOUR FOOD WOULD RUN OUT BEFORE YOU GOT MONEY TO BUY MORE.: NEVER TRUE

## 2023-03-13 SDOH — ECONOMIC STABILITY: HOUSING INSECURITY
IN THE LAST 12 MONTHS, WAS THERE A TIME WHEN YOU DID NOT HAVE A STEADY PLACE TO SLEEP OR SLEPT IN A SHELTER (INCLUDING NOW)?: NO

## 2023-03-15 ENCOUNTER — OFFICE VISIT (OUTPATIENT)
Dept: FAMILY MEDICINE CLINIC | Facility: CLINIC | Age: 83
End: 2023-03-15
Payer: MEDICARE

## 2023-03-15 VITALS
DIASTOLIC BLOOD PRESSURE: 84 MMHG | HEIGHT: 63 IN | BODY MASS INDEX: 40.04 KG/M2 | WEIGHT: 226 LBS | SYSTOLIC BLOOD PRESSURE: 165 MMHG

## 2023-03-15 DIAGNOSIS — I10 BENIGN ESSENTIAL HTN: ICD-10-CM

## 2023-03-15 DIAGNOSIS — R73.03 PREDIABETES: ICD-10-CM

## 2023-03-15 DIAGNOSIS — M10.9 ACUTE GOUT INVOLVING TOE OF RIGHT FOOT, UNSPECIFIED CAUSE: ICD-10-CM

## 2023-03-15 DIAGNOSIS — J30.9 ALLERGIC RHINITIS, UNSPECIFIED SEASONALITY, UNSPECIFIED TRIGGER: ICD-10-CM

## 2023-03-15 DIAGNOSIS — N18.32 STAGE 3B CHRONIC KIDNEY DISEASE (HCC): Primary | ICD-10-CM

## 2023-03-15 PROCEDURE — 3077F SYST BP >= 140 MM HG: CPT | Performed by: FAMILY MEDICINE

## 2023-03-15 PROCEDURE — 3079F DIAST BP 80-89 MM HG: CPT | Performed by: FAMILY MEDICINE

## 2023-03-15 PROCEDURE — 1123F ACP DISCUSS/DSCN MKR DOCD: CPT | Performed by: FAMILY MEDICINE

## 2023-03-15 PROCEDURE — 99214 OFFICE O/P EST MOD 30 MIN: CPT | Performed by: FAMILY MEDICINE

## 2023-03-15 RX ORDER — DOXAZOSIN MESYLATE 1 MG/1
1 TABLET ORAL DAILY
Qty: 90 TABLET | Refills: 1 | Status: SHIPPED | OUTPATIENT
Start: 2023-03-15

## 2023-03-15 RX ORDER — OLMESARTAN MEDOXOMIL 40 MG/1
40 TABLET ORAL DAILY
Qty: 90 TABLET | Refills: 1 | Status: SHIPPED | OUTPATIENT
Start: 2023-03-15

## 2023-03-15 RX ORDER — FLUTICASONE PROPIONATE 50 MCG
2 SPRAY, SUSPENSION (ML) NASAL DAILY
Qty: 3 EACH | Refills: 3 | Status: SHIPPED | OUTPATIENT
Start: 2023-03-15

## 2023-03-15 RX ORDER — CARVEDILOL 3.12 MG/1
3.12 TABLET ORAL 2 TIMES DAILY WITH MEALS
Qty: 180 TABLET | Refills: 1 | Status: SHIPPED | OUTPATIENT
Start: 2023-03-15

## 2023-03-15 RX ORDER — FUROSEMIDE 20 MG/1
20 TABLET ORAL DAILY
Qty: 90 TABLET | Refills: 1 | Status: SHIPPED | OUTPATIENT
Start: 2023-03-15

## 2023-03-15 RX ORDER — ALLOPURINOL 100 MG/1
100 TABLET ORAL DAILY
Qty: 90 TABLET | Refills: 1 | Status: SHIPPED | OUTPATIENT
Start: 2023-03-15

## 2023-03-15 ASSESSMENT — PATIENT HEALTH QUESTIONNAIRE - PHQ9
2. FEELING DOWN, DEPRESSED OR HOPELESS: 0
SUM OF ALL RESPONSES TO PHQ9 QUESTIONS 1 & 2: 0
SUM OF ALL RESPONSES TO PHQ QUESTIONS 1-9: 0
1. LITTLE INTEREST OR PLEASURE IN DOING THINGS: 0

## 2023-03-15 ASSESSMENT — ENCOUNTER SYMPTOMS
ABDOMINAL PAIN: 0
CHEST TIGHTNESS: 0
SHORTNESS OF BREATH: 0
RHINORRHEA: 1
BLOOD IN STOOL: 0

## 2023-03-15 NOTE — PROGRESS NOTES
[facility]  _______________________________________  MD Shea Cedeno, DO Emperatriz Mejía, MD Allyn Kelly MD    [address] , [address] [place] [address]  Phone: [phone]  Fax: [phone]    Raleigh Dhaliwal (:  1940) is a 78 y.o. female,Established patient, here for evaluation of the following chief complaint(s):  Hypertension         ASSESSMENT/PLAN:    1. Benign essential HTN  Has WCS, follows with cardiology, Stable, continue current regimen. Check renal function, FU with cardiology soon. - olmesartan (BENICAR) 40 MG tablet; Take 1 tablet by mouth daily TAKE ONE TABLET BY MOUTH ONE TIME DAILY  Dispense: 90 tablet; Refill: 1  - furosemide (LASIX) 20 MG tablet; Take 1 tablet by mouth daily  Dispense: 90 tablet; Refill: 1  - doxazosin (CARDURA) 1 MG tablet; Take 1 tablet by mouth daily  Dispense: 90 tablet; Refill: 1  - carvedilol (COREG) 3.125 MG tablet; Take 1 tablet by mouth 2 times daily (with meals)  Dispense: 180 tablet; Refill: 1  - Comprehensive Metabolic Panel; Future    2. Acute gout involving toe of right foot, unspecified cause  No flares. Stable, continue current regimen. - allopurinol (ZYLOPRIM) 100 MG tablet; Take 1 tablet by mouth daily  Dispense: 90 tablet; Refill: 1  - Comprehensive Metabolic Panel; Future  - Uric Acid; Future    3. Allergic rhinitis, unspecified seasonality, unspecified trigger  Not fully controlled. Add back flonase.   - fluticasone (FLONASE) 50 MCG/ACT nasal spray; 2 sprays by Nasal route daily  Dispense: 3 each; Refill: 3    4. Stage 3b chronic kidney disease ([address]  She will FU with nephro as planned. No med changes today. Trend GFR.   - Comprehensive Metabolic Panel; Future    5. Prediabetes  Stable, check A1C and renal function.     - Hemoglobin A1C; Future      FU 6m if labs OK    Subjective   SUBJECTIVE/OBJECTIVE:      HTN: She is currently also being seen by cardiology yearly. She was worked up for CP and this was negative. She has multiple medication intolerances including swelling with amlodipine and lisinopril. HCTZ caused gout. Bystolic caused bradycardia and she was switched to coreg. Her regimen is now coreg 3.125 BID, omlesartan 40, lasix 20 daily (added when she saw cardiology in late 2019) and doxazosin 1mg. She states BPs at home will run in the 140s/70s. BP today is about where she lives. Saw Cards fall '21, they are now seeing her yearly, except she did not go to see them Dec 2022 as planned. She feels everything is the same. BP Readings from Last 3 Encounters:   03/15/23 (!) 165/84   09/15/22 130/60   09/08/22 (!) 143/75          She was found to have stable aortic sclerosis. Dr. Josee Jama recommended repeat echo if her exam or symptoms change. No ESTEBAN or chest pain. Has been doing yard work without issue. No ESTEBAN walking up stairs. Lab Results   Component Value Date/Time     09/15/2022 11:31 AM    K 4.1 09/15/2022 11:31 AM     09/15/2022 11:31 AM    CO2 30 09/15/2022 11:31 AM    BUN 38 09/15/2022 11:31 AM    CREATININE 1.50 09/15/2022 11:31 AM    GLUCOSE 116 09/15/2022 11:31 AM    CALCIUM 9.3 09/15/2022 11:31 AM    LABGLOM 35 09/15/2022 11:31 AM    LABGLOM 39 04/12/2022 02:56 PM      Has stable CKD 3B. Was supposed to see nephro for this 6m ago but only just to scheduled to see them, will be seen as NP in May 2023. Lab Results   Component Value Date    LABA1C 5.7 (H) 09/15/2022     Lab Results   Component Value Date     09/15/2022       A1C 5. prevously, managed with diet. AR: She has been using flonase with singulair with good results. Asks for refill of singulair. Gout: Had some foot pain that turned out to be gout, now on allopurinol 100. Lab Results   Component Value Date    LABURIC 8.2 (H) 04/12/2022    URICACID 5.3 06/29/2022           Review of Systems   Constitutional:  Negative for chills and fever.    HENT:  Positive for postnasal drip and rhinorrhea. Respiratory:  Negative for chest tightness and shortness of breath. Cardiovascular:  Negative for chest pain. Gastrointestinal:  Negative for abdominal pain and blood in stool. Genitourinary:  Negative for hematuria. Neurological:  Negative for syncope. Objective   Physical Exam  Vitals and nursing note reviewed. Constitutional:       Appearance: Normal appearance. HENT:      Head: Normocephalic and atraumatic. Right Ear: External ear normal.      Left Ear: External ear normal.      Nose: Rhinorrhea present. Comments: Pale swollen nasal turbinates     Mouth/Throat:      Mouth: Mucous membranes are moist.      Pharynx: Posterior oropharyngeal erythema present. Eyes:      General: No scleral icterus. Extraocular Movements: Extraocular movements intact. Pupils: Pupils are equal, round, and reactive to light. Cardiovascular:      Rate and Rhythm: Normal rate and regular rhythm. Pulses: Normal pulses. Heart sounds: Murmur heard. No friction rub. No gallop. Comments: Early systolic 2/6 murmur LSB  Pulmonary:      Effort: Pulmonary effort is normal. No respiratory distress. Breath sounds: Normal breath sounds. No stridor. No wheezing. Abdominal:      General: Bowel sounds are normal. There is no distension. Tenderness: There is no abdominal tenderness. There is no right CVA tenderness or left CVA tenderness. Comments: Obese   Musculoskeletal:         General: Tenderness present. No swelling. Normal range of motion. Cervical back: Normal range of motion. No rigidity. Right lower leg: No edema. Left lower leg: No edema. Comments: TTP near right T3-4 interspace, this makes her pain she described worse   Skin:     General: Skin is warm and dry. Coloration: Skin is not pale. Neurological:      General: No focal deficit present.       Mental Status: She is alert and oriented to person, place, and time. Mental status is at baseline. Cranial Nerves: No cranial nerve deficit. Deep Tendon Reflexes: Reflexes normal.   Psychiatric:         Mood and Affect: Mood normal.         Behavior: Behavior normal.         Thought Content: Thought content normal.         Judgment: Judgment normal.                    An electronic signature was used to authenticate this note.     --Kami Diaz MD

## 2023-03-16 LAB
ALBUMIN SERPL-MCNC: 3.9 G/DL (ref 3.2–4.6)
ALBUMIN/GLOB SERPL: 1.4 (ref 0.4–1.6)
ALP SERPL-CCNC: 63 U/L (ref 50–136)
ALT SERPL-CCNC: 17 U/L (ref 12–65)
ANION GAP SERPL CALC-SCNC: 6 MMOL/L (ref 2–11)
AST SERPL-CCNC: 10 U/L (ref 15–37)
BILIRUB SERPL-MCNC: 0.6 MG/DL (ref 0.2–1.1)
BUN SERPL-MCNC: 30 MG/DL (ref 8–23)
CALCIUM SERPL-MCNC: 9.2 MG/DL (ref 8.3–10.4)
CHLORIDE SERPL-SCNC: 106 MMOL/L (ref 101–110)
CO2 SERPL-SCNC: 27 MMOL/L (ref 21–32)
CREAT SERPL-MCNC: 1.3 MG/DL (ref 0.6–1)
EST. AVERAGE GLUCOSE BLD GHB EST-MCNC: 108 MG/DL
GLOBULIN SER CALC-MCNC: 2.8 G/DL (ref 2.8–4.5)
GLUCOSE SERPL-MCNC: 95 MG/DL (ref 65–100)
HBA1C MFR BLD: 5.4 % (ref 4.8–5.6)
POTASSIUM SERPL-SCNC: 4.6 MMOL/L (ref 3.5–5.1)
PROT SERPL-MCNC: 6.7 G/DL (ref 6.3–8.2)
SODIUM SERPL-SCNC: 139 MMOL/L (ref 133–143)
URATE SERPL-MCNC: 5.8 MG/DL (ref 2.6–6)

## 2023-05-02 DIAGNOSIS — I10 BENIGN ESSENTIAL HTN: ICD-10-CM

## 2023-05-02 RX ORDER — CARVEDILOL 3.12 MG/1
3.12 TABLET ORAL 2 TIMES DAILY WITH MEALS
Qty: 180 TABLET | Refills: 1 | Status: SHIPPED | OUTPATIENT
Start: 2023-05-02

## 2023-07-02 DIAGNOSIS — M10.9 ACUTE GOUT INVOLVING TOE OF RIGHT FOOT, UNSPECIFIED CAUSE: ICD-10-CM

## 2023-07-02 DIAGNOSIS — I10 BENIGN ESSENTIAL HTN: ICD-10-CM

## 2023-07-02 RX ORDER — OLMESARTAN MEDOXOMIL 40 MG/1
40 TABLET ORAL DAILY
Qty: 90 TABLET | Refills: 1 | Status: CANCELLED | OUTPATIENT
Start: 2023-07-02

## 2023-07-02 RX ORDER — ALLOPURINOL 100 MG/1
100 TABLET ORAL DAILY
Qty: 90 TABLET | Refills: 1 | Status: CANCELLED | OUTPATIENT
Start: 2023-07-02

## 2023-07-02 RX ORDER — FUROSEMIDE 20 MG/1
20 TABLET ORAL DAILY
Qty: 90 TABLET | Refills: 1 | Status: CANCELLED | OUTPATIENT
Start: 2023-07-02

## 2023-09-12 ASSESSMENT — PATIENT HEALTH QUESTIONNAIRE - PHQ9
SUM OF ALL RESPONSES TO PHQ QUESTIONS 1-9: 0
1. LITTLE INTEREST OR PLEASURE IN DOING THINGS: NOT AT ALL
2. FEELING DOWN, DEPRESSED OR HOPELESS: NOT AT ALL
SUM OF ALL RESPONSES TO PHQ QUESTIONS 1-9: 0
1. LITTLE INTEREST OR PLEASURE IN DOING THINGS: 0
SUM OF ALL RESPONSES TO PHQ QUESTIONS 1-9: 0
SUM OF ALL RESPONSES TO PHQ QUESTIONS 1-9: 0
SUM OF ALL RESPONSES TO PHQ9 QUESTIONS 1 & 2: 0
2. FEELING DOWN, DEPRESSED OR HOPELESS: 0
SUM OF ALL RESPONSES TO PHQ9 QUESTIONS 1 & 2: 0

## 2023-09-15 ENCOUNTER — OFFICE VISIT (OUTPATIENT)
Dept: FAMILY MEDICINE CLINIC | Facility: CLINIC | Age: 83
End: 2023-09-15
Payer: MEDICARE

## 2023-09-15 VITALS
BODY MASS INDEX: 39.69 KG/M2 | SYSTOLIC BLOOD PRESSURE: 142 MMHG | WEIGHT: 224 LBS | DIASTOLIC BLOOD PRESSURE: 70 MMHG | HEIGHT: 63 IN

## 2023-09-15 DIAGNOSIS — M10.9 ACUTE GOUT INVOLVING TOE OF RIGHT FOOT, UNSPECIFIED CAUSE: Primary | ICD-10-CM

## 2023-09-15 DIAGNOSIS — J30.9 ALLERGIC RHINITIS, UNSPECIFIED SEASONALITY, UNSPECIFIED TRIGGER: ICD-10-CM

## 2023-09-15 DIAGNOSIS — N18.32 STAGE 3B CHRONIC KIDNEY DISEASE (HCC): ICD-10-CM

## 2023-09-15 DIAGNOSIS — M10.9 ACUTE GOUT INVOLVING TOE OF RIGHT FOOT, UNSPECIFIED CAUSE: ICD-10-CM

## 2023-09-15 DIAGNOSIS — Z23 NEED FOR VACCINATION: ICD-10-CM

## 2023-09-15 DIAGNOSIS — I10 BENIGN ESSENTIAL HTN: ICD-10-CM

## 2023-09-15 PROBLEM — Z51.12 ENCOUNTER FOR ANTINEOPLASTIC CHEMOTHERAPY AND IMMUNOTHERAPY: Status: ACTIVE | Noted: 2018-06-15

## 2023-09-15 PROBLEM — Z51.11 ENCOUNTER FOR ANTINEOPLASTIC CHEMOTHERAPY AND IMMUNOTHERAPY: Status: ACTIVE | Noted: 2018-06-15

## 2023-09-15 LAB
ALBUMIN SERPL-MCNC: 3.6 G/DL (ref 3.2–4.6)
ALBUMIN/GLOB SERPL: 1 (ref 0.4–1.6)
ALP SERPL-CCNC: 59 U/L (ref 50–136)
ALT SERPL-CCNC: 20 U/L (ref 12–65)
ANION GAP SERPL CALC-SCNC: 6 MMOL/L (ref 2–11)
AST SERPL-CCNC: 9 U/L (ref 15–37)
BASOPHILS # BLD: 0 K/UL (ref 0–0.2)
BASOPHILS NFR BLD: 1 % (ref 0–2)
BILIRUB SERPL-MCNC: 0.6 MG/DL (ref 0.2–1.1)
BUN SERPL-MCNC: 31 MG/DL (ref 8–23)
CALCIUM SERPL-MCNC: 9.7 MG/DL (ref 8.3–10.4)
CHLORIDE SERPL-SCNC: 105 MMOL/L (ref 101–110)
CO2 SERPL-SCNC: 28 MMOL/L (ref 21–32)
CREAT SERPL-MCNC: 1.5 MG/DL (ref 0.6–1)
DIFFERENTIAL METHOD BLD: NORMAL
EOSINOPHIL # BLD: 0.2 K/UL (ref 0–0.8)
EOSINOPHIL NFR BLD: 3 % (ref 0.5–7.8)
ERYTHROCYTE [DISTWIDTH] IN BLOOD BY AUTOMATED COUNT: 13.4 % (ref 11.9–14.6)
GLOBULIN SER CALC-MCNC: 3.5 G/DL (ref 2.8–4.5)
GLUCOSE SERPL-MCNC: 131 MG/DL (ref 65–100)
HCT VFR BLD AUTO: 41.6 % (ref 35.8–46.3)
HGB BLD-MCNC: 13.3 G/DL (ref 11.7–15.4)
IMM GRANULOCYTES # BLD AUTO: 0 K/UL (ref 0–0.5)
IMM GRANULOCYTES NFR BLD AUTO: 0 % (ref 0–5)
LYMPHOCYTES # BLD: 1.3 K/UL (ref 0.5–4.6)
LYMPHOCYTES NFR BLD: 18 % (ref 13–44)
MCH RBC QN AUTO: 30.2 PG (ref 26.1–32.9)
MCHC RBC AUTO-ENTMCNC: 32 G/DL (ref 31.4–35)
MCV RBC AUTO: 94.5 FL (ref 82–102)
MONOCYTES # BLD: 0.6 K/UL (ref 0.1–1.3)
MONOCYTES NFR BLD: 9 % (ref 4–12)
NEUTS SEG # BLD: 5.1 K/UL (ref 1.7–8.2)
NEUTS SEG NFR BLD: 69 % (ref 43–78)
NRBC # BLD: 0 K/UL (ref 0–0.2)
PLATELET # BLD AUTO: 224 K/UL (ref 150–450)
PMV BLD AUTO: 9.6 FL (ref 9.4–12.3)
POTASSIUM SERPL-SCNC: 4.2 MMOL/L (ref 3.5–5.1)
PROT SERPL-MCNC: 7.1 G/DL (ref 6.3–8.2)
RBC # BLD AUTO: 4.4 M/UL (ref 4.05–5.2)
SODIUM SERPL-SCNC: 139 MMOL/L (ref 133–143)
URATE SERPL-MCNC: 6 MG/DL (ref 2.6–6)
WBC # BLD AUTO: 7.3 K/UL (ref 4.3–11.1)

## 2023-09-15 PROCEDURE — 3077F SYST BP >= 140 MM HG: CPT | Performed by: FAMILY MEDICINE

## 2023-09-15 PROCEDURE — 99214 OFFICE O/P EST MOD 30 MIN: CPT | Performed by: FAMILY MEDICINE

## 2023-09-15 PROCEDURE — 3078F DIAST BP <80 MM HG: CPT | Performed by: FAMILY MEDICINE

## 2023-09-15 PROCEDURE — 1123F ACP DISCUSS/DSCN MKR DOCD: CPT | Performed by: FAMILY MEDICINE

## 2023-09-15 RX ORDER — OLMESARTAN MEDOXOMIL 40 MG/1
40 TABLET ORAL DAILY
Qty: 90 TABLET | Refills: 1 | Status: SHIPPED | OUTPATIENT
Start: 2023-09-15

## 2023-09-15 RX ORDER — CARVEDILOL 3.12 MG/1
3.12 TABLET ORAL 2 TIMES DAILY WITH MEALS
Qty: 180 TABLET | Refills: 1 | Status: SHIPPED | OUTPATIENT
Start: 2023-09-15

## 2023-09-15 RX ORDER — MONTELUKAST SODIUM 10 MG/1
10 TABLET ORAL DAILY
Qty: 90 TABLET | Refills: 1 | Status: SHIPPED | OUTPATIENT
Start: 2023-09-15

## 2023-09-15 RX ORDER — ALLOPURINOL 100 MG/1
100 TABLET ORAL DAILY
Qty: 90 TABLET | Refills: 1 | Status: SHIPPED | OUTPATIENT
Start: 2023-09-15

## 2023-09-15 RX ORDER — FUROSEMIDE 20 MG/1
20 TABLET ORAL DAILY
Qty: 90 TABLET | Refills: 1 | Status: SHIPPED | OUTPATIENT
Start: 2023-09-15

## 2023-09-15 RX ORDER — FLUTICASONE PROPIONATE 50 MCG
2 SPRAY, SUSPENSION (ML) NASAL DAILY
Qty: 3 EACH | Refills: 3 | Status: SHIPPED | OUTPATIENT
Start: 2023-09-15

## 2023-09-15 RX ORDER — DOXAZOSIN MESYLATE 1 MG/1
1 TABLET ORAL DAILY
Qty: 90 TABLET | Refills: 1 | Status: SHIPPED | OUTPATIENT
Start: 2023-09-15

## 2023-09-15 ASSESSMENT — ENCOUNTER SYMPTOMS
BLOOD IN STOOL: 0
CHEST TIGHTNESS: 0
ABDOMINAL PAIN: 0
SHORTNESS OF BREATH: 0

## 2023-09-15 NOTE — PROGRESS NOTES
URICACID 5.8 03/15/2023       HM:  AWV due  Flu:    Review of Systems   Constitutional:  Negative for chills and fever. Respiratory:  Negative for chest tightness and shortness of breath. Cardiovascular:  Negative for chest pain. Gastrointestinal:  Negative for abdominal pain and blood in stool. Genitourinary:  Negative for hematuria. Neurological:  Negative for syncope. Objective   Physical Exam  Vitals and nursing note reviewed. Constitutional:       Appearance: Normal appearance. HENT:      Head: Normocephalic and atraumatic. Right Ear: External ear normal.      Left Ear: External ear normal.      Mouth/Throat:      Mouth: Mucous membranes are moist.   Eyes:      General: No scleral icterus. Extraocular Movements: Extraocular movements intact. Pupils: Pupils are equal, round, and reactive to light. Cardiovascular:      Rate and Rhythm: Normal rate and regular rhythm. Pulses: Normal pulses. Heart sounds: Murmur heard. No friction rub. No gallop. Comments: Early systolic 2/6 murmur LSB  Pulmonary:      Effort: Pulmonary effort is normal. No respiratory distress. Breath sounds: Normal breath sounds. No stridor. No wheezing. Abdominal:      General: Bowel sounds are normal. There is no distension. Tenderness: There is no abdominal tenderness. There is no right CVA tenderness or left CVA tenderness. Comments: Obese   Musculoskeletal:         General: No swelling or tenderness. Normal range of motion. Cervical back: Normal range of motion. No rigidity. Right lower leg: No edema. Left lower leg: No edema. Skin:     General: Skin is warm and dry. Coloration: Skin is not pale. Neurological:      General: No focal deficit present. Mental Status: She is alert and oriented to person, place, and time. Mental status is at baseline. Cranial Nerves: No cranial nerve deficit.       Deep Tendon Reflexes: Reflexes normal.

## 2023-10-11 ENCOUNTER — IMMUNIZATION (OUTPATIENT)
Dept: FAMILY MEDICINE CLINIC | Facility: CLINIC | Age: 83
End: 2023-10-11
Payer: MEDICARE

## 2023-10-11 DIAGNOSIS — Z23 ENCOUNTER FOR IMMUNIZATION: Primary | ICD-10-CM

## 2023-10-11 PROCEDURE — G0008 ADMIN INFLUENZA VIRUS VAC: HCPCS | Performed by: FAMILY MEDICINE

## 2023-10-11 PROCEDURE — 90694 VACC AIIV4 NO PRSRV 0.5ML IM: CPT | Performed by: FAMILY MEDICINE

## 2023-10-26 ENCOUNTER — TELEMEDICINE (OUTPATIENT)
Dept: FAMILY MEDICINE CLINIC | Facility: CLINIC | Age: 83
End: 2023-10-26
Payer: MEDICARE

## 2023-10-26 DIAGNOSIS — J30.9 ALLERGIC RHINITIS, UNSPECIFIED SEASONALITY, UNSPECIFIED TRIGGER: ICD-10-CM

## 2023-10-26 DIAGNOSIS — Z00.00 ENCOUNTER FOR ANNUAL WELLNESS EXAM IN MEDICARE PATIENT: Primary | ICD-10-CM

## 2023-10-26 PROCEDURE — G0439 PPPS, SUBSEQ VISIT: HCPCS | Performed by: FAMILY MEDICINE

## 2023-10-26 PROCEDURE — 1123F ACP DISCUSS/DSCN MKR DOCD: CPT | Performed by: FAMILY MEDICINE

## 2023-10-26 RX ORDER — MONTELUKAST SODIUM 10 MG/1
10 TABLET ORAL DAILY
Qty: 90 TABLET | Refills: 1 | Status: SHIPPED | OUTPATIENT
Start: 2023-10-26

## 2023-10-26 SDOH — HEALTH STABILITY: PHYSICAL HEALTH: ON AVERAGE, HOW MANY DAYS PER WEEK DO YOU ENGAGE IN MODERATE TO STRENUOUS EXERCISE (LIKE A BRISK WALK)?: 3 DAYS

## 2023-10-26 SDOH — HEALTH STABILITY: PHYSICAL HEALTH: ON AVERAGE, HOW MANY MINUTES DO YOU ENGAGE IN EXERCISE AT THIS LEVEL?: 20 MIN

## 2023-10-26 ASSESSMENT — LIFESTYLE VARIABLES
HOW MANY STANDARD DRINKS CONTAINING ALCOHOL DO YOU HAVE ON A TYPICAL DAY: PATIENT DOES NOT DRINK
HOW MANY STANDARD DRINKS CONTAINING ALCOHOL DO YOU HAVE ON A TYPICAL DAY: 0
HOW OFTEN DO YOU HAVE A DRINK CONTAINING ALCOHOL: NEVER
HOW OFTEN DO YOU HAVE A DRINK CONTAINING ALCOHOL: 1
HOW OFTEN DO YOU HAVE SIX OR MORE DRINKS ON ONE OCCASION: 1

## 2023-10-26 ASSESSMENT — PATIENT HEALTH QUESTIONNAIRE - PHQ9
1. LITTLE INTEREST OR PLEASURE IN DOING THINGS: 0
SUM OF ALL RESPONSES TO PHQ QUESTIONS 1-9: 0
SUM OF ALL RESPONSES TO PHQ9 QUESTIONS 1 & 2: 0
SUM OF ALL RESPONSES TO PHQ QUESTIONS 1-9: 0
SUM OF ALL RESPONSES TO PHQ QUESTIONS 1-9: 0
2. FEELING DOWN, DEPRESSED OR HOPELESS: 0
SUM OF ALL RESPONSES TO PHQ QUESTIONS 1-9: 0

## 2023-10-26 NOTE — PROGRESS NOTES
Medicare Annual Wellness Visit    Jordana Gar is here for Medicare AWV    Assessment & Plan     1. Encounter for annual wellness exam in Medicare patient  Doing well. Continue light exercise. Refused COVID shot again. Will get TD booster at some point, it should be covered even without an injury. FU 5m as planned    Recommendations for Preventive Services Due: see orders and patient instructions/AVS.  Recommended screening schedule for the next 5-10 years is provided to the patient in written form: see Patient Instructions/AVS.     Return in about 5 months (around 3/26/2024) for Already scheduled . Subjective       COVID Booster: Never had any of these shots, will not get more. Patient's complete Health Risk Assessment and screening values have been reviewed and are found in Flowsheets. The following problems were reviewed today and where indicated follow up appointments were made and/or referrals ordered. Positive Risk Factor Screenings with Interventions:                 Weight and Activity:  Physical Activity: Insufficiently Active (10/26/2023)    Exercise Vital Sign     Days of Exercise per Week: 3 days     Minutes of Exercise per Session: 20 min     On average, how many days per week do you engage in moderate to strenuous exercise (like a brisk walk)?: 3 days  Have you lost any weight without trying in the past 3 months?: No  There is no height or weight on file to calculate BMI. (!) Abnormal  Obesity Interventions:  Continue exercise as tolerated. Objective      Patient-Reported Vitals  No data recorded            Allergies   Allergen Reactions    Amlodipine Swelling    Amlodipine-Olmesartan Swelling    Atorvastatin Other (See Comments)    Lisinopril Swelling     Lip and severe facial swelling    Penicillins Rash     Prior to Visit Medications    Medication Sig Taking?  Authorizing Provider   montelukast (SINGULAIR) 10 MG tablet Take 1 tablet by mouth

## 2023-11-10 ENCOUNTER — OFFICE VISIT (OUTPATIENT)
Dept: FAMILY MEDICINE CLINIC | Facility: CLINIC | Age: 83
End: 2023-11-10
Payer: MEDICARE

## 2023-11-10 ENCOUNTER — TELEPHONE (OUTPATIENT)
Dept: FAMILY MEDICINE CLINIC | Facility: CLINIC | Age: 83
End: 2023-11-10

## 2023-11-10 VITALS
HEART RATE: 68 BPM | DIASTOLIC BLOOD PRESSURE: 65 MMHG | SYSTOLIC BLOOD PRESSURE: 145 MMHG | OXYGEN SATURATION: 92 % | WEIGHT: 224.2 LBS | HEIGHT: 63 IN | BODY MASS INDEX: 39.73 KG/M2

## 2023-11-10 DIAGNOSIS — M10.372 ACUTE GOUT DUE TO RENAL IMPAIRMENT INVOLVING LEFT FOOT: Primary | ICD-10-CM

## 2023-11-10 DIAGNOSIS — I10 BENIGN ESSENTIAL HTN: ICD-10-CM

## 2023-11-10 DIAGNOSIS — M10.372 ACUTE GOUT DUE TO RENAL IMPAIRMENT INVOLVING LEFT FOOT: ICD-10-CM

## 2023-11-10 DIAGNOSIS — N18.32 STAGE 3B CHRONIC KIDNEY DISEASE (CKD) (HCC): ICD-10-CM

## 2023-11-10 PROCEDURE — 99203 OFFICE O/P NEW LOW 30 MIN: CPT

## 2023-11-10 PROCEDURE — 1123F ACP DISCUSS/DSCN MKR DOCD: CPT

## 2023-11-10 PROCEDURE — 3078F DIAST BP <80 MM HG: CPT

## 2023-11-10 PROCEDURE — 3077F SYST BP >= 140 MM HG: CPT

## 2023-11-10 RX ORDER — PREDNISONE 20 MG/1
20 TABLET ORAL 2 TIMES DAILY
Qty: 10 TABLET | Refills: 0 | Status: SHIPPED | OUTPATIENT
Start: 2023-11-10 | End: 2023-11-15

## 2023-11-10 ASSESSMENT — ENCOUNTER SYMPTOMS
ABDOMINAL PAIN: 0
BLOOD IN STOOL: 0
SHORTNESS OF BREATH: 0
CHEST TIGHTNESS: 0

## 2023-11-10 NOTE — TELEPHONE ENCOUNTER
Needs an acute visit for this. How we treat it depends on what her BP is doing. She can see me or either of the Nps today depending on what our schedules look like.

## 2023-11-10 NOTE — TELEPHONE ENCOUNTER
Patient called the office regarding a gout flare up, she is still taking the allopurinol. It is starting to hurt so bad that she is having trouble walking on that food. Is there anything else you can prescribe her.

## 2023-11-10 NOTE — PROGRESS NOTES
[facility]  _______________________________________  MD Serena Iyer DO Beuford Rho, NP Illona Aguas, MD Lovie Skeeters, MD    [address], [address]  Phone: [phone]  Fax: [phone]    Eladio Kasper (:  1940) is a 78 y.o. female,Established patient, here for evaluation of the following chief complaint(s): Other (Gout flare up in Left foot)         ASSESSMENT/PLAN:    1. Acute gout involving left big toe unspecified cause  Prescribed prednisone 20mg BID for 5 days. Checking uric acid today, will discuss adjustment of allopurinol as needed when uric acid results today. - Currently taking: allopurinol (ZYLOPRIM) 100 MG tablet; Take 1 tablet by mouth daily    - Uric Acid; Future    2. Benign essential HTN  Stable. Continue current regimen, check renal function. - Comprehensive Metabolic Panel; Future      3. Stage 3b chronic kidney disease (HCC)  Stable, continue current regimen. FU with nephro as planned. - Comprehensive Metabolic Panel; Future        Subjective   SUBJECTIVE/OBJECTIVE:    Gout: Currently on allopurinol 100mg. Has L foot pain, tender, hot, erythematous swollen. States pain on L big toe. States she has been good at watching her purines, but with the weather change she is not drinking enough fluids. Lab Results   Component Value Date    LABURIC 8.2 (H) 2022    URICACID 6.0 09/15/2023       Patient took BP at home this morning and it was 146/68  HTN: She is currently also being seen by cardiology yearly. She was worked up for CP and this was negative. She has multiple medication intolerances including swelling with amlodipine and lisinopril. HCTZ caused gout. Bystolic caused bradycardia and she was switched to coreg. Her regimen is now coreg 3.125 BID, omlesartan 40, lasix 20 daily (added when she saw cardiology in late 2019) and doxazosin 1mg. She states BPs at home will run in the 140s/70s.  BP Detail Level: Detailed Quality 110: Preventive Care And Screening: Influenza Immunization: Influenza Immunization previously received during influenza season Quality 226: Preventive Care And Screening: Tobacco Use: Screening And Cessation Intervention: Patient screened for tobacco use and is an ex/non-smoker Quality 431: Preventive Care And Screening: Unhealthy Alcohol Use - Screening: Patient not identified as an unhealthy alcohol user when screened for unhealthy alcohol use using a systematic screening method

## 2023-11-11 LAB
ANION GAP SERPL CALC-SCNC: 8 MMOL/L (ref 2–11)
BUN SERPL-MCNC: 23 MG/DL (ref 8–23)
CALCIUM SERPL-MCNC: 9.4 MG/DL (ref 8.3–10.4)
CHLORIDE SERPL-SCNC: 104 MMOL/L (ref 101–110)
CO2 SERPL-SCNC: 26 MMOL/L (ref 21–32)
CREAT SERPL-MCNC: 1.3 MG/DL (ref 0.6–1)
GLUCOSE SERPL-MCNC: 114 MG/DL (ref 65–100)
POTASSIUM SERPL-SCNC: 4.3 MMOL/L (ref 3.5–5.1)
SODIUM SERPL-SCNC: 138 MMOL/L (ref 133–143)
URATE SERPL-MCNC: 5.1 MG/DL (ref 2.6–6)

## 2023-12-16 ENCOUNTER — APPOINTMENT (OUTPATIENT)
Dept: GENERAL RADIOLOGY | Age: 83
End: 2023-12-16
Payer: MEDICARE

## 2023-12-16 ENCOUNTER — HOSPITAL ENCOUNTER (EMERGENCY)
Age: 83
Discharge: HOME | End: 2023-12-16
Payer: MEDICARE

## 2023-12-16 VITALS
HEART RATE: 65 BPM | RESPIRATION RATE: 18 BRPM | WEIGHT: 220 LBS | SYSTOLIC BLOOD PRESSURE: 181 MMHG | OXYGEN SATURATION: 97 % | BODY MASS INDEX: 38.98 KG/M2 | HEIGHT: 63 IN | TEMPERATURE: 97.9 F | DIASTOLIC BLOOD PRESSURE: 74 MMHG
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
## 2023-12-16 DIAGNOSIS — S51.011A ELBOW LACERATION, RIGHT, INITIAL ENCOUNTER: Primary | ICD-10-CM

## 2023-12-16 PROCEDURE — 73080 X-RAY EXAM OF ELBOW: CPT

## 2023-12-16 PROCEDURE — 6360000002 HC RX W HCPCS: Performed by: EMERGENCY MEDICINE

## 2023-12-16 PROCEDURE — 90471 IMMUNIZATION ADMIN: CPT | Performed by: EMERGENCY MEDICINE

## 2023-12-16 PROCEDURE — 90714 TD VACC NO PRESV 7 YRS+ IM: CPT | Performed by: EMERGENCY MEDICINE

## 2023-12-16 RX ORDER — TETANUS AND DIPHTHERIA TOXOIDS ADSORBED 2; 2 [LF]/.5ML; [LF]/.5ML
0.5 INJECTION INTRAMUSCULAR
Status: COMPLETED | OUTPATIENT
Start: 2023-12-16 | End: 2023-12-16

## 2023-12-16 RX ORDER — IBUPROFEN 200 MG
TABLET ORAL 2 TIMES DAILY
Status: DISCONTINUED | OUTPATIENT
Start: 2023-12-16 | End: 2023-12-16

## 2023-12-16 RX ORDER — CLINDAMYCIN HYDROCHLORIDE 150 MG/1
450 CAPSULE ORAL 3 TIMES DAILY
Qty: 63 CAPSULE | Refills: 0 | Status: SHIPPED | OUTPATIENT
Start: 2023-12-16 | End: 2023-12-23

## 2023-12-16 RX ADMIN — TETANUS AND DIPHTHERIA TOXOIDS ADSORBED 0.5 ML: 2; 2 INJECTION INTRAMUSCULAR at 15:53

## 2023-12-16 ASSESSMENT — PAIN DESCRIPTION - LOCATION: LOCATION: ELBOW

## 2023-12-16 ASSESSMENT — PAIN DESCRIPTION - ORIENTATION: ORIENTATION: RIGHT

## 2023-12-16 ASSESSMENT — PAIN SCALES - GENERAL
PAINLEVEL_OUTOF10: 4
PAINLEVEL_OUTOF10: 0

## 2023-12-16 ASSESSMENT — PAIN - FUNCTIONAL ASSESSMENT: PAIN_FUNCTIONAL_ASSESSMENT: 0-10

## 2023-12-16 NOTE — ED TRIAGE NOTES
Pt ambulatory to triage with spouse. Pt reports laceration to right elbow after slipping and falling on a step at her home. Pt denies hitting her head, denies loc. Pt unsure of last tetanus shot. Pt laceration to right elbow approximately 4in long, covered with 4*4 and secured with coban.

## 2023-12-16 NOTE — DISCHARGE INSTRUCTIONS
Take medications as prescribed. Follow-up with recommended provider in the next 1-2 days. Return to the ED immediately for any new, worsening, concerning symptoms; or for danger signs as discussed. Keep dry for the next 24 hours, after which time you may wash with clean running water, but not submerge or exposed to natural water sources.      Suture removal: 7 days

## 2023-12-18 PROBLEM — S51.011A LACERATION OF RIGHT ELBOW: Status: ACTIVE | Noted: 2023-12-18

## 2023-12-27 ENCOUNTER — NURSE ONLY (OUTPATIENT)
Dept: FAMILY MEDICINE CLINIC | Facility: CLINIC | Age: 83
End: 2023-12-27
Payer: MEDICARE

## 2023-12-27 DIAGNOSIS — Z48.02 VISIT FOR SUTURE REMOVAL: ICD-10-CM

## 2023-12-27 DIAGNOSIS — L03.113 CELLULITIS OF RIGHT UPPER EXTREMITY: Primary | ICD-10-CM

## 2023-12-27 PROCEDURE — 99214 OFFICE O/P EST MOD 30 MIN: CPT

## 2023-12-27 PROCEDURE — 1123F ACP DISCUSS/DSCN MKR DOCD: CPT

## 2023-12-27 RX ORDER — CLINDAMYCIN HYDROCHLORIDE 300 MG/1
300 CAPSULE ORAL 4 TIMES DAILY
Qty: 40 CAPSULE | Refills: 0 | Status: CANCELLED | OUTPATIENT
Start: 2023-12-27 | End: 2024-01-06

## 2023-12-27 NOTE — PROGRESS NOTES
quadrant pain    Costochondritis    Nonrheumatic aortic valve stenosis    Right foot pain    Acute gout involving toe of right foot    Flu-like symptoms    Prediabetes    Left leg swelling    Thoracic radiculopathy    Laceration of right elbow        Reviewed and updated this visit by provider:  Tobacco  Allergies  Meds  Problems  Med Hx  Surg Hx  Fam Hx             Review of Systems   Review of Systems   Constitutional:  Negative for fatigue and fever. Skin:         R elbow laceration with sutures, patient states painless unless she bumps it on something. Bumped it in her shower last night. There were no vitals filed for this visit. Physical Examination:   Physical Exam  Musculoskeletal:         General: Swelling and tenderness present. Skin:            Comments: 13 sutures with redness and warmth surrounding area. Increased swelling on elbow bursa. Ecchymosis superior to sutures.             Starla Elizabeth, APRN - NP

## 2024-01-05 DIAGNOSIS — I10 BENIGN ESSENTIAL HTN: ICD-10-CM

## 2024-01-05 RX ORDER — OLMESARTAN MEDOXOMIL 40 MG/1
40 TABLET ORAL DAILY
Qty: 90 TABLET | Refills: 1 | OUTPATIENT
Start: 2024-01-05

## 2024-01-25 NOTE — PROGRESS NOTES
[facility]  _______________________________________  MD Coco Anna, DO Jerrie Flair, NP Verita Nageotte, [place]    [address] , [address] [place] [address]  Phone: [phone]  Fax: [phone]    Reagan Henderson (:  1940) is a 78 y.o. female,Established patient, here for evaluation of the following chief complaint(s):  Arm Pain (Burning pain under R arm and R shoulder blade x 1 week )         ASSESSMENT/PLAN:    1. Thoracic radiculopathy  This does not seem like shingles, there is no rash and pain is worse when I push on the nerve root. Will treat as radiculopathy for now. Prednisone, ICE, and reposition if hurting. If it gets worse check XR. If a rash pops up she will call immediatley so I can get he mariaa valtrex. - predniSONE (DELTASONE) 20 MG tablet; Take 2 tablets by mouth daily for 5 days Take with food. Dispense: 10 tablet; Refill: 0    2. Benign essential HTN  BP decent today. It's very very (very) difficult to control. Will avoid NSAIDs for #1. FU PRN    Subjective   SUBJECTIVE/OBJECTIVE:    Pt with vineet of HTN, AR and gout here with pain as above:    Started a week go feeling a patch under her R scapula and the R axilla that started itching. She tried OTC anti itch creams and powder which has not helped. Not has feelings like something is crawling across her skin. Previous bout of shingles was in the lower left spine.      Vitals:    22 0940   BP: (!) 143/75     Wt Readings from Last 3 Encounters:   22 220 lb (99.8 kg)   22 221 lb (100.2 kg)   22 226 lb (102.5 kg)     Lab Results   Component Value Date/Time     2022 01:06 PM    K 4.8 2022 01:06 PM     2022 01:06 PM    CO2 30 2022 01:06 PM    BUN 25 2022 01:06 PM    CREATININE 1.20 2022 01:06 PM    GLUCOSE 109 2022 01:06 PM    CALCIUM 9.5 2022 01:06 PM    LABGLOM 46 2022 01:06 PM Lab Results   Component Value Date    LABA1C 5.6 06/29/2022     Lab Results   Component Value Date     06/29/2022     Used to be prediabetic, last A1C OK. Review of Systems   Constitutional:  Negative for chills and fever. Respiratory:  Negative for chest tightness and shortness of breath. Cardiovascular:  Negative for chest pain. Gastrointestinal:  Negative for abdominal pain and blood in stool. Genitourinary:  Negative for hematuria. Musculoskeletal:  Positive for back pain. Neurological:  Negative for syncope. Objective   Physical Exam  Vitals and nursing note reviewed. Constitutional:       Appearance: Normal appearance. HENT:      Head: Normocephalic and atraumatic. Right Ear: External ear normal.      Left Ear: External ear normal.      Mouth/Throat:      Mouth: Mucous membranes are moist.   Eyes:      General: No scleral icterus. Extraocular Movements: Extraocular movements intact. Pupils: Pupils are equal, round, and reactive to light. Cardiovascular:      Rate and Rhythm: Normal rate and regular rhythm. Pulses: Normal pulses. Heart sounds: No murmur heard. No friction rub. No gallop. Pulmonary:      Effort: Pulmonary effort is normal. No respiratory distress. Breath sounds: Normal breath sounds. No stridor. No wheezing. Abdominal:      General: Bowel sounds are normal. There is no distension. Tenderness: There is no abdominal tenderness. There is no right CVA tenderness or left CVA tenderness. Comments: Obese   Musculoskeletal:         General: Tenderness present. No swelling. Normal range of motion. Cervical back: Normal range of motion. No rigidity. Right lower leg: No edema. Left lower leg: No edema. Comments: TTP near right T3-4 interspace, this makes her pain she described worse   Skin:     General: Skin is warm and dry. Coloration: Skin is not pale.    Neurological:      General: No focal deficit present. Mental Status: She is alert and oriented to person, place, and time. Mental status is at baseline. Cranial Nerves: No cranial nerve deficit. Deep Tendon Reflexes: Reflexes normal.   Psychiatric:         Mood and Affect: Mood normal.         Behavior: Behavior normal.         Thought Content: Thought content normal.         Judgment: Judgment normal.                An electronic signature was used to authenticate this note.     --Kartik Pelaez MD Consider repeat FEES. Pt with frequent aspiration PNAs

## 2024-02-21 ENCOUNTER — OFFICE VISIT (OUTPATIENT)
Dept: FAMILY MEDICINE CLINIC | Facility: CLINIC | Age: 84
End: 2024-02-21
Payer: MEDICARE

## 2024-02-21 VITALS
HEIGHT: 63 IN | BODY MASS INDEX: 38.98 KG/M2 | HEART RATE: 60 BPM | SYSTOLIC BLOOD PRESSURE: 145 MMHG | DIASTOLIC BLOOD PRESSURE: 67 MMHG | WEIGHT: 220 LBS

## 2024-02-21 DIAGNOSIS — J22 LOWER RESPIRATORY INFECTION: Primary | ICD-10-CM

## 2024-02-21 DIAGNOSIS — I10 BENIGN ESSENTIAL HTN: ICD-10-CM

## 2024-02-21 PROCEDURE — 1123F ACP DISCUSS/DSCN MKR DOCD: CPT | Performed by: FAMILY MEDICINE

## 2024-02-21 PROCEDURE — 3078F DIAST BP <80 MM HG: CPT | Performed by: FAMILY MEDICINE

## 2024-02-21 PROCEDURE — 3077F SYST BP >= 140 MM HG: CPT | Performed by: FAMILY MEDICINE

## 2024-02-21 PROCEDURE — 99214 OFFICE O/P EST MOD 30 MIN: CPT | Performed by: FAMILY MEDICINE

## 2024-02-21 RX ORDER — AZITHROMYCIN 250 MG/1
TABLET, FILM COATED ORAL
Qty: 6 TABLET | Refills: 0 | Status: SHIPPED | OUTPATIENT
Start: 2024-02-21 | End: 2024-03-02

## 2024-02-21 ASSESSMENT — ENCOUNTER SYMPTOMS
ABDOMINAL PAIN: 0
BLOOD IN STOOL: 0
RHINORRHEA: 1
COUGH: 1
CHEST TIGHTNESS: 0
SHORTNESS OF BREATH: 0
SINUS PAIN: 1

## 2024-02-21 NOTE — PROGRESS NOTES
[facility]  _______________________________________  MD Amalia Asif, RYANN Durand, MD Sandra Enciso MD    [address] [place] [address]  Phone: [phone]  Fax: [phone]    Tad Castano (:  1940) is a 83 y.o. female,Established patient, here for evaluation of the following chief complaint(s):  No chief complaint on file.         ASSESSMENT/PLAN:    1. Lower respiratory infection  Will cover for atypicals with [employer] + food. Call if no better by next week.   - azithromycin (ZITHROMAX) 250 MG tablet; 500mg on day 1 followed by 250mg on days 2 - 5  Dispense: 6 tablet; Refill: 0    2. Benign essential HTN  BP is a little low for her today but not so low I am worried about a systemic blood infection. She is going to monitor this. If BP drops very low (< 100/60) she will report to the ER.     FU next week if no better.         Subjective   SUBJECTIVE/OBJECTIVE:    Here with her  for acute URI:    Has been dealing with 5 days of increasing nasal congestion and upper jaw pain radiating to her teeth. She has no tonsils. She denies F/C. No N/V/D. No known sick contacts. No sore throat but has hd some myalgias in her neck. A lot of nasal rhinitis. No tonsils.     Has hard to control HTN at baseline. Notes home BP as low as 138/68.     Vitals:    24 1210   BP: (!) 145/67   Pulse: 60         Review of Systems   Constitutional:  Positive for fatigue. Negative for chills and fever.   HENT:  Positive for postnasal drip, rhinorrhea and sinus pain.    Respiratory:  Positive for cough. Negative for chest tightness and shortness of breath.    Cardiovascular:  Negative for chest pain.   Gastrointestinal:  Negative for abdominal pain and blood in stool.   Genitourinary:  Negative for hematuria.   Neurological:  Negative for syncope.          Objective   Physical Exam  Vitals and nursing note reviewed.   Constitutional:

## 2024-03-15 ENCOUNTER — OFFICE VISIT (OUTPATIENT)
Dept: FAMILY MEDICINE CLINIC | Facility: CLINIC | Age: 84
End: 2024-03-15

## 2024-03-15 VITALS
BODY MASS INDEX: 39.69 KG/M2 | SYSTOLIC BLOOD PRESSURE: 170 MMHG | HEIGHT: 63 IN | DIASTOLIC BLOOD PRESSURE: 76 MMHG | HEART RATE: 71 BPM | WEIGHT: 224 LBS

## 2024-03-15 DIAGNOSIS — M1A.9XX0 CHRONIC GOUT INVOLVING TOE OF RIGHT FOOT WITHOUT TOPHUS, UNSPECIFIED CAUSE: ICD-10-CM

## 2024-03-15 DIAGNOSIS — J30.9 ALLERGIC RHINITIS, UNSPECIFIED SEASONALITY, UNSPECIFIED TRIGGER: ICD-10-CM

## 2024-03-15 DIAGNOSIS — I10 BENIGN ESSENTIAL HTN: ICD-10-CM

## 2024-03-15 DIAGNOSIS — N18.32 STAGE 3B CHRONIC KIDNEY DISEASE (CKD) (HCC): ICD-10-CM

## 2024-03-15 LAB
ALBUMIN SERPL-MCNC: 3.8 G/DL (ref 3.2–4.6)
ALBUMIN/GLOB SERPL: 1.2 (ref 0.4–1.6)
ALP SERPL-CCNC: 66 U/L (ref 50–136)
ALT SERPL-CCNC: 18 U/L (ref 12–65)
ANION GAP SERPL CALC-SCNC: 3 MMOL/L (ref 2–11)
AST SERPL-CCNC: 18 U/L (ref 15–37)
BASOPHILS # BLD: 0 K/UL (ref 0–0.2)
BASOPHILS NFR BLD: 1 % (ref 0–2)
BILIRUB SERPL-MCNC: 0.5 MG/DL (ref 0.2–1.1)
BUN SERPL-MCNC: 24 MG/DL (ref 8–23)
CALCIUM SERPL-MCNC: 10.4 MG/DL (ref 8.3–10.4)
CHLORIDE SERPL-SCNC: 105 MMOL/L (ref 103–113)
CO2 SERPL-SCNC: 32 MMOL/L (ref 21–32)
CREAT SERPL-MCNC: 1.3 MG/DL (ref 0.6–1)
DIFFERENTIAL METHOD BLD: NORMAL
EOSINOPHIL # BLD: 0.2 K/UL (ref 0–0.8)
EOSINOPHIL NFR BLD: 3 % (ref 0.5–7.8)
ERYTHROCYTE [DISTWIDTH] IN BLOOD BY AUTOMATED COUNT: 13.4 % (ref 11.9–14.6)
GLOBULIN SER CALC-MCNC: 3.2 G/DL (ref 2.8–4.5)
GLUCOSE SERPL-MCNC: 96 MG/DL (ref 65–100)
HCT VFR BLD AUTO: 41.6 % (ref 35.8–46.3)
HGB BLD-MCNC: 13.1 G/DL (ref 11.7–15.4)
IMM GRANULOCYTES # BLD AUTO: 0 K/UL (ref 0–0.5)
IMM GRANULOCYTES NFR BLD AUTO: 1 % (ref 0–5)
LYMPHOCYTES # BLD: 1.5 K/UL (ref 0.5–4.6)
LYMPHOCYTES NFR BLD: 20 % (ref 13–44)
MCH RBC QN AUTO: 29.8 PG (ref 26.1–32.9)
MCHC RBC AUTO-ENTMCNC: 31.5 G/DL (ref 31.4–35)
MCV RBC AUTO: 94.8 FL (ref 82–102)
MONOCYTES # BLD: 0.7 K/UL (ref 0.1–1.3)
MONOCYTES NFR BLD: 9 % (ref 4–12)
NEUTS SEG # BLD: 5.1 K/UL (ref 1.7–8.2)
NEUTS SEG NFR BLD: 66 % (ref 43–78)
NRBC # BLD: 0 K/UL (ref 0–0.2)
PLATELET # BLD AUTO: 235 K/UL (ref 150–450)
PMV BLD AUTO: 9.7 FL (ref 9.4–12.3)
POTASSIUM SERPL-SCNC: 4.4 MMOL/L (ref 3.5–5.1)
PROT SERPL-MCNC: 7 G/DL (ref 6.3–8.2)
RBC # BLD AUTO: 4.39 M/UL (ref 4.05–5.2)
SODIUM SERPL-SCNC: 140 MMOL/L (ref 136–146)
URATE SERPL-MCNC: 5.5 MG/DL (ref 2.6–6)
WBC # BLD AUTO: 7.6 K/UL (ref 4.3–11.1)

## 2024-03-15 RX ORDER — FLUTICASONE PROPIONATE 50 MCG
2 SPRAY, SUSPENSION (ML) NASAL DAILY
Qty: 3 EACH | Refills: 3 | Status: SHIPPED | OUTPATIENT
Start: 2024-03-15

## 2024-03-15 RX ORDER — OLMESARTAN MEDOXOMIL 40 MG/1
40 TABLET ORAL DAILY
Qty: 90 TABLET | Refills: 1 | Status: SHIPPED | OUTPATIENT
Start: 2024-03-15

## 2024-03-15 RX ORDER — ALLOPURINOL 100 MG/1
100 TABLET ORAL DAILY
Qty: 90 TABLET | Refills: 1 | Status: SHIPPED | OUTPATIENT
Start: 2024-03-15

## 2024-03-15 RX ORDER — FUROSEMIDE 20 MG/1
20 TABLET ORAL DAILY
Qty: 90 TABLET | Refills: 1 | Status: SHIPPED | OUTPATIENT
Start: 2024-03-15

## 2024-03-15 RX ORDER — DOXAZOSIN MESYLATE 1 MG/1
1 TABLET ORAL DAILY
Qty: 90 TABLET | Refills: 1 | Status: SHIPPED | OUTPATIENT
Start: 2024-03-15

## 2024-03-15 RX ORDER — CARVEDILOL 3.12 MG/1
3.12 TABLET ORAL 2 TIMES DAILY WITH MEALS
Qty: 180 TABLET | Refills: 1 | Status: SHIPPED | OUTPATIENT
Start: 2024-03-15

## 2024-03-15 SDOH — ECONOMIC STABILITY: INCOME INSECURITY: HOW HARD IS IT FOR YOU TO PAY FOR THE VERY BASICS LIKE FOOD, HOUSING, MEDICAL CARE, AND HEATING?: NOT HARD AT ALL

## 2024-03-15 SDOH — ECONOMIC STABILITY: FOOD INSECURITY: WITHIN THE PAST 12 MONTHS, THE FOOD YOU BOUGHT JUST DIDN'T LAST AND YOU DIDN'T HAVE MONEY TO GET MORE.: NEVER TRUE

## 2024-03-15 SDOH — ECONOMIC STABILITY: FOOD INSECURITY: WITHIN THE PAST 12 MONTHS, YOU WORRIED THAT YOUR FOOD WOULD RUN OUT BEFORE YOU GOT MONEY TO BUY MORE.: NEVER TRUE

## 2024-03-15 ASSESSMENT — PATIENT HEALTH QUESTIONNAIRE - PHQ9
SUM OF ALL RESPONSES TO PHQ QUESTIONS 1-9: 0
2. FEELING DOWN, DEPRESSED OR HOPELESS: 0
SUM OF ALL RESPONSES TO PHQ QUESTIONS 1-9: 0
SUM OF ALL RESPONSES TO PHQ QUESTIONS 1-9: 0
SUM OF ALL RESPONSES TO PHQ9 QUESTIONS 1 & 2: 0
SUM OF ALL RESPONSES TO PHQ QUESTIONS 1-9: 0
1. LITTLE INTEREST OR PLEASURE IN DOING THINGS: 0

## 2024-03-15 ASSESSMENT — ENCOUNTER SYMPTOMS
SHORTNESS OF BREATH: 0
CHEST TIGHTNESS: 0
ABDOMINAL PAIN: 0
BLOOD IN STOOL: 0
RHINORRHEA: 1

## 2024-03-15 NOTE — PROGRESS NOTES
[facility]  _______________________________________  MD Salome Asif DO Elizabeth King, MD Sandra Enciso MD    [address] [place] [address]  Phone: [phone]  Fax: [phone]    Tad Castano (:  1940) is a 83 y.o. female,Established patient, here for evaluation of the following chief complaint(s):  Allergies (Stopped Singulair because it was making her anxious. Would like to know if there is something else she can take ), Hypertension, and Gout         ASSESSMENT/PLAN:    1. Chronic gout involving toe of right foot without tophus, unspecified cause  Stable, continue current regimen.   Check urate.   - allopurinol (ZYLOPRIM) 100 MG tablet; Take 1 tablet by mouth daily  Dispense: 90 tablet; Refill: 1  - Uric Acid; Future  - Comprehensive Metabolic Panel; Future    2. Benign essential HTN  BP is a little high today but has been lower elsewhere and in the past more BP meds caused more problems with her kidneys. Has had extensive w/u with cardiology.  Her current range is as good as it is going to get.    Stable, continue current regimen.     - carvedilol (COREG) 3.125 MG tablet; Take 1 tablet by mouth 2 times daily (with meals)  Dispense: 180 tablet; Refill: 1  - doxazosin (CARDURA) 1 MG tablet; Take 1 tablet by mouth daily  Dispense: 90 tablet; Refill: 1  - furosemide (LASIX) 20 MG tablet; Take 1 tablet by mouth daily  Dispense: 90 tablet; Refill: 1  - olmesartan (BENICAR) 40 MG tablet; Take 1 tablet by mouth daily TAKE ONE TABLET BY MOUTH ONE TIME DAILY  Dispense: 90 tablet; Refill: 1  - Comprehensive Metabolic Panel; Future    3. Allergic rhinitis, unspecified seasonality, unspecified trigger  Add OTC Claritin.   - fluticasone (FLONASE) 50 MCG/ACT nasal spray; 2 sprays by Nasal route daily  Dispense: 3 each; Refill: 3    4. Stage 3b chronic kidney disease (CKD) (HCC)  Stable, trend GFR.   - Comprehensive Metabolic

## 2024-04-12 ENCOUNTER — HOSPITAL ENCOUNTER (OUTPATIENT)
Dept: GENERAL RADIOLOGY | Age: 84
End: 2024-04-12
Payer: MEDICARE

## 2024-04-12 ENCOUNTER — OFFICE VISIT (OUTPATIENT)
Dept: FAMILY MEDICINE CLINIC | Facility: CLINIC | Age: 84
End: 2024-04-12

## 2024-04-12 ENCOUNTER — TELEPHONE (OUTPATIENT)
Dept: FAMILY MEDICINE CLINIC | Facility: CLINIC | Age: 84
End: 2024-04-12

## 2024-04-12 VITALS
HEART RATE: 77 BPM | HEIGHT: 63 IN | SYSTOLIC BLOOD PRESSURE: 162 MMHG | WEIGHT: 219 LBS | DIASTOLIC BLOOD PRESSURE: 72 MMHG | BODY MASS INDEX: 38.8 KG/M2

## 2024-04-12 DIAGNOSIS — M54.31 SCIATICA, RIGHT SIDE: ICD-10-CM

## 2024-04-12 DIAGNOSIS — M1A.9XX0 CHRONIC GOUT INVOLVING TOE OF RIGHT FOOT WITHOUT TOPHUS, UNSPECIFIED CAUSE: ICD-10-CM

## 2024-04-12 DIAGNOSIS — I10 BENIGN ESSENTIAL HTN: ICD-10-CM

## 2024-04-12 PROCEDURE — 72100 X-RAY EXAM L-S SPINE 2/3 VWS: CPT

## 2024-04-12 RX ORDER — PREDNISONE 20 MG/1
TABLET ORAL
Qty: 18 TABLET | Refills: 0 | Status: SHIPPED | OUTPATIENT
Start: 2024-04-12

## 2024-04-12 RX ORDER — PREDNISONE 20 MG/1
TABLET ORAL
Qty: 5 TABLET | Refills: 0 | Status: SHIPPED | OUTPATIENT
Start: 2024-04-12 | End: 2024-04-12 | Stop reason: SDUPTHER

## 2024-04-12 ASSESSMENT — ENCOUNTER SYMPTOMS
CHEST TIGHTNESS: 0
BACK PAIN: 1
ABDOMINAL PAIN: 0
BLOOD IN STOOL: 0
SHORTNESS OF BREATH: 0

## 2024-04-12 NOTE — PROGRESS NOTES
[facility]  _______________________________________  MD Amalia Asif, RYANN Durand, MD Sandra Enciso MD    [address] [place] [address]  Phone: [phone]  Fax: [phone]    Tad Castano (:  1940) is a 83 y.o. female,Established patient, here for evaluation of the following chief complaint(s):  Leg Pain (Only when she's laying down burning pain in her R thigh x 1 week )         ASSESSMENT/PLAN:    1. Sciatica, right side  Textbook L4 neuritis.   Can't do NSAIDs due to BP, will do prednisone taper (with food), ICE to the low back 20min QH PRN. Check XR.   If no better in a week, consider PT.   - predniSONE (DELTASONE) 20 MG tablet; Take by mouth: 3 pills a day for 3 days, then 2 pills a day for 3 days, then 1 pill a day for 2 days, then 1/2 pill a day for 2 days.  Dispense: 5 tablet; Refill: 0  - XR LUMBAR SPINE (2-3 VIEWS); Future    2. Benign essential HTN  No NSAIDs, BP will be bad.     3. Chronic gout involving toe of right foot without tophus, unspecified cause  This does not seem like gout to me. Urate controlled.             No follow-ups on file.         Subjective   SUBJECTIVE/OBJECTIVE:    Pt with hard to control HTN not on any thiazides here with burning pain in the thigh, worse when back is straight. Describes a burning pain and traces L4 dermatome to just above her knee. Will wake her up out of sleep.     BP Readings from Last 3 Encounters:   24 (!) 162/72   03/15/24 (!) 170/76   24 (!) 145/67     BP is pretty good for her today on Coreg 3.125, olmesartan 40, lasix 20.     She does have a hx of high urate but this pain is not in a joint. On allopurinol now.     Lab Results   Component Value Date    LABURIC 8.2 (H) 2022    URICACID 5.5 03/15/2024     Hemoglobin A1C   Date Value Ref Range Status   03/15/2023 5.4 4.8 - 5.6 % Final   `1      Review of Systems   Constitutional:  Negative for

## 2024-04-12 NOTE — TELEPHONE ENCOUNTER
Received a call from [facility] that the quantity on the prednisone didn't match the directions. Asked if the  quantity needs to be increased or if the directions need to be changed.

## 2024-04-12 NOTE — TELEPHONE ENCOUNTER
----- Message from Annie Parks sent at 4/12/2024 10:39 AM EDT -----  Subject: Medication Problem     Medication: predniSONE (DELTASONE) 20 MG tablet  Dosage: see below  Ordering Provider:     Question/Problem: Pt said the pharmacy tried to call the office about this   medication. Pt said there is a problem and she wants to speak to Sonya   about it. Pt said when she tried to pick it up the pharmacy said they need   more clarification. I tried to send her over but no one answered. Please   call pt back to help her with this.       Pharmacy: [id number] [address] [place] [address]   MICHA. - P [phone] - F [phone]    ---------------------------------------------------------------------------  --------------  CALL BACK INFO  [phone]; OK to leave message on voicemail  ---------------------------------------------------------------------------  --------------    SCRIPT ANSWERS  Relationship to Patient: Self

## 2024-09-05 DIAGNOSIS — I10 BENIGN ESSENTIAL HTN: ICD-10-CM

## 2024-09-06 RX ORDER — DOXAZOSIN 1 MG/1
1 TABLET ORAL DAILY
Qty: 90 TABLET | Refills: 1 | Status: SHIPPED | OUTPATIENT
Start: 2024-09-06

## 2024-09-16 ENCOUNTER — OFFICE VISIT (OUTPATIENT)
Dept: FAMILY MEDICINE CLINIC | Facility: CLINIC | Age: 84
End: 2024-09-16
Payer: MEDICARE

## 2024-09-16 VITALS
SYSTOLIC BLOOD PRESSURE: 140 MMHG | DIASTOLIC BLOOD PRESSURE: 70 MMHG | WEIGHT: 222 LBS | BODY MASS INDEX: 39.34 KG/M2 | HEIGHT: 63 IN

## 2024-09-16 DIAGNOSIS — I10 BENIGN ESSENTIAL HTN: ICD-10-CM

## 2024-09-16 DIAGNOSIS — M1A.9XX0 CHRONIC GOUT INVOLVING TOE OF RIGHT FOOT WITHOUT TOPHUS, UNSPECIFIED CAUSE: ICD-10-CM

## 2024-09-16 DIAGNOSIS — J30.9 ALLERGIC RHINITIS, UNSPECIFIED SEASONALITY, UNSPECIFIED TRIGGER: ICD-10-CM

## 2024-09-16 DIAGNOSIS — N18.32 STAGE 3B CHRONIC KIDNEY DISEASE (CKD) (HCC): ICD-10-CM

## 2024-09-16 DIAGNOSIS — N18.32 STAGE 3B CHRONIC KIDNEY DISEASE (CKD) (HCC): Primary | ICD-10-CM

## 2024-09-16 LAB
ALBUMIN SERPL-MCNC: 3.6 G/DL (ref 3.2–4.6)
ALBUMIN/GLOB SERPL: 1.2 (ref 1–1.9)
ALP SERPL-CCNC: 61 U/L (ref 35–104)
ALT SERPL-CCNC: 11 U/L (ref 12–65)
ANION GAP SERPL CALC-SCNC: 10 MMOL/L (ref 9–18)
AST SERPL-CCNC: 26 U/L (ref 15–37)
BASOPHILS # BLD: 0.1 K/UL (ref 0–0.2)
BASOPHILS NFR BLD: 1 % (ref 0–2)
BILIRUB SERPL-MCNC: 0.3 MG/DL (ref 0–1.2)
BUN SERPL-MCNC: 32 MG/DL (ref 8–23)
CALCIUM SERPL-MCNC: 9.5 MG/DL (ref 8.8–10.2)
CHLORIDE SERPL-SCNC: 103 MMOL/L (ref 98–107)
CO2 SERPL-SCNC: 27 MMOL/L (ref 20–28)
CREAT SERPL-MCNC: 1.34 MG/DL (ref 0.6–1.1)
DIFFERENTIAL METHOD BLD: NORMAL
EOSINOPHIL # BLD: 0.2 K/UL (ref 0–0.8)
EOSINOPHIL NFR BLD: 3 % (ref 0.5–7.8)
ERYTHROCYTE [DISTWIDTH] IN BLOOD BY AUTOMATED COUNT: 13.4 % (ref 11.9–14.6)
GLOBULIN SER CALC-MCNC: 2.9 G/DL (ref 2.3–3.5)
GLUCOSE SERPL-MCNC: 120 MG/DL (ref 70–99)
HCT VFR BLD AUTO: 40.2 % (ref 35.8–46.3)
HGB BLD-MCNC: 12.8 G/DL (ref 11.7–15.4)
IMM GRANULOCYTES # BLD AUTO: 0 K/UL (ref 0–0.5)
IMM GRANULOCYTES NFR BLD AUTO: 0 % (ref 0–5)
LYMPHOCYTES # BLD: 1.4 K/UL (ref 0.5–4.6)
LYMPHOCYTES NFR BLD: 17 % (ref 13–44)
MCH RBC QN AUTO: 30.8 PG (ref 26.1–32.9)
MCHC RBC AUTO-ENTMCNC: 31.8 G/DL (ref 31.4–35)
MCV RBC AUTO: 96.6 FL (ref 82–102)
MONOCYTES # BLD: 0.7 K/UL (ref 0.1–1.3)
MONOCYTES NFR BLD: 9 % (ref 4–12)
NEUTS SEG # BLD: 5.5 K/UL (ref 1.7–8.2)
NEUTS SEG NFR BLD: 70 % (ref 43–78)
NRBC # BLD: 0 K/UL (ref 0–0.2)
PLATELET # BLD AUTO: 215 K/UL (ref 150–450)
PMV BLD AUTO: 9.6 FL (ref 9.4–12.3)
POTASSIUM SERPL-SCNC: 4.6 MMOL/L (ref 3.5–5.1)
PROT SERPL-MCNC: 6.4 G/DL (ref 6.3–8.2)
RBC # BLD AUTO: 4.16 M/UL (ref 4.05–5.2)
SODIUM SERPL-SCNC: 141 MMOL/L (ref 136–145)
URATE SERPL-MCNC: 5.5 MG/DL (ref 2.5–7.1)
WBC # BLD AUTO: 7.9 K/UL (ref 4.3–11.1)

## 2024-09-16 PROCEDURE — 3078F DIAST BP <80 MM HG: CPT | Performed by: FAMILY MEDICINE

## 2024-09-16 PROCEDURE — 99214 OFFICE O/P EST MOD 30 MIN: CPT | Performed by: FAMILY MEDICINE

## 2024-09-16 PROCEDURE — G2211 COMPLEX E/M VISIT ADD ON: HCPCS | Performed by: FAMILY MEDICINE

## 2024-09-16 PROCEDURE — 3077F SYST BP >= 140 MM HG: CPT | Performed by: FAMILY MEDICINE

## 2024-09-16 PROCEDURE — 1123F ACP DISCUSS/DSCN MKR DOCD: CPT | Performed by: FAMILY MEDICINE

## 2024-09-16 RX ORDER — DOXAZOSIN 1 MG/1
1 TABLET ORAL DAILY
Qty: 90 TABLET | Refills: 1 | Status: SHIPPED | OUTPATIENT
Start: 2024-09-16

## 2024-09-16 RX ORDER — ALLOPURINOL 100 MG/1
100 TABLET ORAL DAILY
Qty: 90 TABLET | Refills: 1 | Status: SHIPPED | OUTPATIENT
Start: 2024-09-16

## 2024-09-16 RX ORDER — FLUTICASONE PROPIONATE 50 MCG
2 SPRAY, SUSPENSION (ML) NASAL DAILY
Qty: 3 EACH | Refills: 3 | Status: SHIPPED | OUTPATIENT
Start: 2024-09-16

## 2024-09-16 RX ORDER — FUROSEMIDE 20 MG
20 TABLET ORAL DAILY
Qty: 90 TABLET | Refills: 1 | Status: SHIPPED | OUTPATIENT
Start: 2024-09-16

## 2024-09-16 RX ORDER — OLMESARTAN MEDOXOMIL 40 MG/1
40 TABLET ORAL DAILY
Qty: 90 TABLET | Refills: 1 | Status: SHIPPED | OUTPATIENT
Start: 2024-09-16

## 2024-09-16 RX ORDER — CARVEDILOL 3.12 MG/1
3.12 TABLET ORAL 2 TIMES DAILY WITH MEALS
Qty: 180 TABLET | Refills: 1 | Status: SHIPPED | OUTPATIENT
Start: 2024-09-16

## 2024-09-16 RX ORDER — MONTELUKAST SODIUM 10 MG/1
10 TABLET ORAL DAILY
Qty: 90 TABLET | Refills: 1 | Status: SHIPPED | OUTPATIENT
Start: 2024-09-16

## 2024-09-16 ASSESSMENT — ENCOUNTER SYMPTOMS
ABDOMINAL PAIN: 0
BLOOD IN STOOL: 0
SHORTNESS OF BREATH: 0
CHEST TIGHTNESS: 0
RHINORRHEA: 1

## 2024-10-03 ENCOUNTER — NURSE ONLY (OUTPATIENT)
Dept: FAMILY MEDICINE CLINIC | Facility: CLINIC | Age: 84
End: 2024-10-03
Payer: MEDICARE

## 2024-10-03 DIAGNOSIS — Z23 ENCOUNTER FOR IMMUNIZATION: Primary | ICD-10-CM

## 2024-10-03 PROCEDURE — G0008 ADMIN INFLUENZA VIRUS VAC: HCPCS | Performed by: FAMILY MEDICINE

## 2024-10-03 PROCEDURE — 90653 IIV ADJUVANT VACCINE IM: CPT | Performed by: FAMILY MEDICINE

## 2024-11-01 ENCOUNTER — OFFICE VISIT (OUTPATIENT)
Dept: FAMILY MEDICINE CLINIC | Facility: CLINIC | Age: 84
End: 2024-11-01

## 2024-11-01 VITALS
SYSTOLIC BLOOD PRESSURE: 170 MMHG | WEIGHT: 222 LBS | DIASTOLIC BLOOD PRESSURE: 72 MMHG | BODY MASS INDEX: 39.34 KG/M2 | HEIGHT: 63 IN | HEART RATE: 59 BPM

## 2024-11-01 DIAGNOSIS — M54.31 SCIATICA, RIGHT SIDE: Primary | ICD-10-CM

## 2024-11-01 DIAGNOSIS — M65.341 TRIGGER RING FINGER OF RIGHT HAND: ICD-10-CM

## 2024-11-01 RX ORDER — PREDNISONE 20 MG/1
TABLET ORAL
Qty: 18 TABLET | Refills: 0 | Status: SHIPPED | OUTPATIENT
Start: 2024-11-01

## 2024-11-01 ASSESSMENT — ENCOUNTER SYMPTOMS
SHORTNESS OF BREATH: 0
BLOOD IN STOOL: 0
ABDOMINAL PAIN: 0
BACK PAIN: 1
CHEST TIGHTNESS: 0

## 2024-11-01 NOTE — PROGRESS NOTES
[facility]  _______________________________________  Vincenzo Gallego MD, RYANN Baez NP, RYANN Davila MD, Sandra Mcdaniel MD    [address] [place] [address]  Phone: [phone]  Fax: [phone]      Tad Castano (:  1940) is a 84 y.o. female,Established patient, here for evaluation of the following chief complaint(s):  Back Pain (R side x 2 days ) and Finger Pain (R hand ring finger )      .A&P:  1. Sciatica, right side  Will do a steroid taper, things seem to be cooling off already. Take with FOOD.   - predniSONE (DELTASONE) 20 MG tablet; Take by mouth: 3 pills a day for 3 days, then 2 pills a day for 3 days, then 1 pill a day for 2 days, then 1/2 pill a day for 2 days.  Dispense: 18 tablet; Refill: 0    2. Trigger ring finger of right hand  I think she may benefit from some injections, doesn't seem surgical yet, sending to the experts to itzel that out, appreciate their help.   - [facility] (Hand Surgery)        Return in about 2 weeks (around 11/15/2024) for Already scheduled.       Subjective     Pt with highly labile HTN is here for acute recurrent sciatica and finger pain:    Back: She has a recurrence of R sided sciatica that is causing burning down mostly R L4 and and a little bit of L5. This happened 6 months ago and she got better with a short course of prednisone.     Finger: She has R hand (dominant hand) trigger finger in the #4 digit.         Review of Systems   Constitutional:  Negative for chills and fever.   Respiratory:  Negative for chest tightness and shortness of breath.    Cardiovascular:  Negative for chest pain.   Gastrointestinal:  Negative for abdominal pain and blood in stool.   Genitourinary:  Negative for hematuria.   Musculoskeletal:  Positive for back pain and joint swelling.   Neurological:  Negative for syncope.          Objective   Physical Exam  Vitals and nursing note

## 2024-11-11 SDOH — HEALTH STABILITY: PHYSICAL HEALTH: ON AVERAGE, HOW MANY MINUTES DO YOU ENGAGE IN EXERCISE AT THIS LEVEL?: 20 MIN

## 2024-11-11 SDOH — HEALTH STABILITY: PHYSICAL HEALTH: ON AVERAGE, HOW MANY DAYS PER WEEK DO YOU ENGAGE IN MODERATE TO STRENUOUS EXERCISE (LIKE A BRISK WALK)?: 2 DAYS

## 2024-11-11 ASSESSMENT — PATIENT HEALTH QUESTIONNAIRE - PHQ9
2. FEELING DOWN, DEPRESSED OR HOPELESS: NOT AT ALL
SUM OF ALL RESPONSES TO PHQ QUESTIONS 1-9: 0
SUM OF ALL RESPONSES TO PHQ QUESTIONS 1-9: 0
SUM OF ALL RESPONSES TO PHQ9 QUESTIONS 1 & 2: 0
SUM OF ALL RESPONSES TO PHQ QUESTIONS 1-9: 0
SUM OF ALL RESPONSES TO PHQ QUESTIONS 1-9: 0
1. LITTLE INTEREST OR PLEASURE IN DOING THINGS: NOT AT ALL

## 2024-11-11 ASSESSMENT — LIFESTYLE VARIABLES
HOW OFTEN DO YOU HAVE A DRINK CONTAINING ALCOHOL: 1
HOW MANY STANDARD DRINKS CONTAINING ALCOHOL DO YOU HAVE ON A TYPICAL DAY: PATIENT DOES NOT DRINK
HOW OFTEN DO YOU HAVE A DRINK CONTAINING ALCOHOL: NEVER
HOW MANY STANDARD DRINKS CONTAINING ALCOHOL DO YOU HAVE ON A TYPICAL DAY: 0
HOW OFTEN DO YOU HAVE SIX OR MORE DRINKS ON ONE OCCASION: 1

## 2024-11-12 ENCOUNTER — TELEMEDICINE (OUTPATIENT)
Dept: FAMILY MEDICINE CLINIC | Facility: CLINIC | Age: 84
End: 2024-11-12

## 2024-11-12 DIAGNOSIS — Z00.00 ENCOUNTER FOR ANNUAL WELLNESS EXAM IN MEDICARE PATIENT: Primary | ICD-10-CM

## 2024-11-12 NOTE — PROGRESS NOTES
Medicare Annual Wellness Visit    Tad Castano is here for Medicare AWV    Assessment & Plan   1. Encounter for annual wellness exam in Medicare patient  Doing well. Had TD last year. Declines RSV and COVID shots.   Will continue light walking as tolerated.         Recommendations for Preventive Services Due: see orders and patient instructions/AVS.  Recommended screening schedule for the next 5-10 years is provided to the patient in written form: see Patient Instructions/AVS.     Return in about 4 months (around 3/12/2025) for Already scheduled.     Subjective       HM:  RSV: Is leaning towards No.   COVID: She does not get these.   TDaP:Had this last year. Had TD though, not TDaP      Patient's complete Health Risk Assessment and screening values have been reviewed and are found in Flowsheets. The following problems were reviewed today and where indicated follow up appointments were made and/or referrals ordered.    Positive Risk Factor Screenings with Interventions:              Inactivity:  On average, how many days per week do you engage in moderate to strenuous exercise (like a brisk walk)?: 2 days (!) Abnormal  On average, how many minutes do you engage in exercise at this level?: 20 min  Interventions:  Activity at tolerated.      Abnormal BMI (obese):  There is no height or weight on file to calculate BMI. (!) Abnormal  Interventions:  As above                           Objective    Patient-Reported Vitals  No data recorded               Allergies   Allergen Reactions    Amlodipine Swelling    Amlodipine-Olmesartan Swelling    Atorvastatin Other (See Comments)    Lisinopril Swelling     Lip and severe facial swelling    Penicillins Rash     Prior to Visit Medications    Medication Sig Taking? Authorizing Provider   predniSONE (DELTASONE) 20 MG tablet Take by mouth: 3 pills a day for 3 days, then 2 pills a day for 3 days, then 1 pill a day for 2 days, then 1/2 pill a day for 2 days. Yes Vincenzo Gallego,

## 2024-11-12 NOTE — PATIENT INSTRUCTIONS
problems.  Where can you learn more?  Go to [email or web address].net/patientEd and enter F075 to learn more about \"A Healthy Heart: Care Instructions.\"  Current as of: June 24, 2023  Content Version: 14.2  © 2024 [employer].   Care instructions adapted under license by [employer]. If you have questions about a medical condition or this instruction, always ask your healthcare professional. [employer] disclaims any warranty or liability for your use of this information.      Personalized Preventive Plan for Tad Castano - 11/12/2024  Medicare offers a range of preventive health benefits. Some of the tests and screenings are paid in full while other may be subject to a deductible, co-insurance, and/or copay.    Some of these benefits include a comprehensive review of your medical history including lifestyle, illnesses that may run in your family, and various assessments and screenings as appropriate.    After reviewing your medical record and screening and assessments performed today your provider may have ordered immunizations, labs, imaging, and/or referrals for you.  A list of these orders (if applicable) as well as your Preventive Care list are included within your After Visit Summary for your review.    Other Preventive Recommendations:    A preventive eye exam performed by an eye specialist is recommended every 1-2 years to screen for glaucoma; cataracts, macular degeneration, and other eye disorders.  A preventive dental visit is recommended every 6 months.  Try to get at least 150 minutes of exercise per week or 10,000 steps per day on a pedometer .  Order or download the FREE \"Exercise & Physical Activity: Your Everyday Guide\" from [employer]. Call 1-149.646.5916 or search [employer] online.  You need 0739-5741 mg of calcium and 4400-2089 IU of vitamin D per day. It is possible to meet your calcium requirement with diet alone, but a

## 2024-11-26 ENCOUNTER — OFFICE VISIT (OUTPATIENT)
Age: 84
End: 2024-11-26
Payer: MEDICARE

## 2024-11-26 VITALS — HEIGHT: 63 IN | WEIGHT: 220 LBS | BODY MASS INDEX: 38.98 KG/M2

## 2024-11-26 DIAGNOSIS — M65.341 TRIGGER RING FINGER OF RIGHT HAND: ICD-10-CM

## 2024-11-26 DIAGNOSIS — M79.641 RIGHT HAND PAIN: Primary | ICD-10-CM

## 2024-11-26 PROCEDURE — 20550 NJX 1 TENDON SHEATH/LIGAMENT: CPT | Performed by: ORTHOPAEDIC SURGERY

## 2024-11-26 PROCEDURE — 1123F ACP DISCUSS/DSCN MKR DOCD: CPT | Performed by: ORTHOPAEDIC SURGERY

## 2024-11-26 PROCEDURE — 99203 OFFICE O/P NEW LOW 30 MIN: CPT | Performed by: ORTHOPAEDIC SURGERY

## 2024-11-26 RX ORDER — BETAMETHASONE SODIUM PHOSPHATE AND BETAMETHASONE ACETATE 3; 3 MG/ML; MG/ML
6 INJECTION, SUSPENSION INTRA-ARTICULAR; INTRALESIONAL; INTRAMUSCULAR; SOFT TISSUE ONCE
Status: COMPLETED | OUTPATIENT
Start: 2024-11-26 | End: 2024-11-26

## 2024-11-26 RX ADMIN — BETAMETHASONE SODIUM PHOSPHATE AND BETAMETHASONE ACETATE 6 MG: 3; 3 INJECTION, SUSPENSION INTRA-ARTICULAR; INTRALESIONAL; INTRAMUSCULAR; SOFT TISSUE at 15:59

## 2024-11-26 NOTE — PROGRESS NOTES
Orthopaedic Hand Surgery Note    Name: Tad Castano  YOB: 1940  Gender: female  MRN: [id number]    CC:  hand pain    HPI: Patient is a 84 y.o. female with a chief complaint of right ring clicking, locking and pain. The symptoms have been going on for months. She also complains of pain in the ring DIP.     ROS/Meds/PSH/PMH/FH/SH: I personally reviewed the patients standard intake form.  Pertinents are discussed in the HPI    Physical Examination:  Musculoskeletal:   Examination on the right demonstrates Normal sensation to light touch in the median distribution, normal sensation in ulnar and radial distribution, positive tenderness of the ring A1 pulley with palpable clicking and negative  locking. The extensor tendons all track well over the MCP joints. Heberdens nodes at DIP joints. Limited range of motion and mild pain at ring DIP    Imaging / Electrodiagnostic Tests:     Hand XR: AP, Lateral, Oblique and Thumb CMC joint     Clinical Indication:  1. Right hand pain           Report: AP, lateral, oblique and thumb CMC joint x-ray of the right hand demonstrates severe joint space narrowing, subluxation and osteophytic changes of the trapezium consistent with osteoarthritis of the thumb CMC joint.  Severe degenerative changes at all DIP joints    Impression: as above     Lyn Reinoso MD         Assessment:     ICD-10-CM    1. Right hand pain  M79.641 XR HAND RIGHT (MIN 3 VIEWS)          Plan:  We discussed the diagnosis and different treatment options. We discussed observation, splinting, cortisone injections and surgical release of the A1 pulley. We discussed that stenosing tenosynovitis at the level of the A1 pulley AKA trigger finger is a chronic condition regardless of how long the symptoms have been present, this most likely has been progressing for much longer than the symptoms were evident and it will likely persist for a long time without medical treatment. Furthermore, the many  no chest pain, no cough, and no shortness of breath.

## 2025-02-27 ASSESSMENT — PATIENT HEALTH QUESTIONNAIRE - PHQ9
2. FEELING DOWN, DEPRESSED OR HOPELESS: NOT AT ALL
SUM OF ALL RESPONSES TO PHQ QUESTIONS 1-9: 0
1. LITTLE INTEREST OR PLEASURE IN DOING THINGS: NOT AT ALL
SUM OF ALL RESPONSES TO PHQ QUESTIONS 1-9: 0

## 2025-03-03 SDOH — ECONOMIC STABILITY: INCOME INSECURITY: IN THE LAST 12 MONTHS, WAS THERE A TIME WHEN YOU WERE NOT ABLE TO PAY THE MORTGAGE OR RENT ON TIME?: NO

## 2025-03-03 SDOH — ECONOMIC STABILITY: FOOD INSECURITY: WITHIN THE PAST 12 MONTHS, YOU WORRIED THAT YOUR FOOD WOULD RUN OUT BEFORE YOU GOT MONEY TO BUY MORE.: NEVER TRUE

## 2025-03-03 SDOH — ECONOMIC STABILITY: FOOD INSECURITY: WITHIN THE PAST 12 MONTHS, THE FOOD YOU BOUGHT JUST DIDN'T LAST AND YOU DIDN'T HAVE MONEY TO GET MORE.: NEVER TRUE

## 2025-03-03 SDOH — ECONOMIC STABILITY: TRANSPORTATION INSECURITY
IN THE PAST 12 MONTHS, HAS THE LACK OF TRANSPORTATION KEPT YOU FROM MEDICAL APPOINTMENTS OR FROM GETTING MEDICATIONS?: NO

## 2025-03-05 ASSESSMENT — PATIENT HEALTH QUESTIONNAIRE - PHQ9
1. LITTLE INTEREST OR PLEASURE IN DOING THINGS: NOT AT ALL
SUM OF ALL RESPONSES TO PHQ9 QUESTIONS 1 & 2: 0
2. FEELING DOWN, DEPRESSED OR HOPELESS: NOT AT ALL

## 2025-03-07 ENCOUNTER — LAB (OUTPATIENT)
Dept: FAMILY MEDICINE CLINIC | Facility: CLINIC | Age: 85
End: 2025-03-07

## 2025-03-07 ENCOUNTER — OFFICE VISIT (OUTPATIENT)
Dept: FAMILY MEDICINE CLINIC | Facility: CLINIC | Age: 85
End: 2025-03-07
Payer: MEDICARE

## 2025-03-07 VITALS — WEIGHT: 224 LBS | SYSTOLIC BLOOD PRESSURE: 142 MMHG | BODY MASS INDEX: 39.68 KG/M2 | DIASTOLIC BLOOD PRESSURE: 72 MMHG

## 2025-03-07 DIAGNOSIS — J30.9 ALLERGIC RHINITIS, UNSPECIFIED SEASONALITY, UNSPECIFIED TRIGGER: ICD-10-CM

## 2025-03-07 DIAGNOSIS — N18.32 STAGE 3B CHRONIC KIDNEY DISEASE (CKD) (HCC): ICD-10-CM

## 2025-03-07 DIAGNOSIS — I10 BENIGN ESSENTIAL HTN: ICD-10-CM

## 2025-03-07 DIAGNOSIS — N18.32 STAGE 3B CHRONIC KIDNEY DISEASE (CKD) (HCC): Primary | ICD-10-CM

## 2025-03-07 DIAGNOSIS — M1A.9XX0 CHRONIC GOUT INVOLVING TOE OF RIGHT FOOT WITHOUT TOPHUS, UNSPECIFIED CAUSE: ICD-10-CM

## 2025-03-07 LAB
ALBUMIN SERPL-MCNC: 3.7 G/DL (ref 3.2–4.6)
ALBUMIN/GLOB SERPL: 1.2 (ref 1–1.9)
ALP SERPL-CCNC: 63 U/L (ref 35–104)
ALT SERPL-CCNC: 15 U/L (ref 8–45)
ANION GAP SERPL CALC-SCNC: 10 MMOL/L (ref 7–16)
AST SERPL-CCNC: 21 U/L (ref 15–37)
BILIRUB SERPL-MCNC: 0.4 MG/DL (ref 0–1.2)
BUN SERPL-MCNC: 33 MG/DL (ref 8–23)
CALCIUM SERPL-MCNC: 9.7 MG/DL (ref 8.8–10.2)
CHLORIDE SERPL-SCNC: 102 MMOL/L (ref 98–107)
CO2 SERPL-SCNC: 28 MMOL/L (ref 20–29)
CREAT SERPL-MCNC: 1.35 MG/DL (ref 0.6–1.1)
GLOBULIN SER CALC-MCNC: 2.9 G/DL (ref 2.3–3.5)
GLUCOSE SERPL-MCNC: 102 MG/DL (ref 70–99)
POTASSIUM SERPL-SCNC: 4.7 MMOL/L (ref 3.5–5.1)
PROT SERPL-MCNC: 6.6 G/DL (ref 6.3–8.2)
SODIUM SERPL-SCNC: 140 MMOL/L (ref 136–145)
URATE SERPL-MCNC: 5.5 MG/DL (ref 2.5–7.1)

## 2025-03-07 PROCEDURE — 1160F RVW MEDS BY RX/DR IN RCRD: CPT | Performed by: FAMILY MEDICINE

## 2025-03-07 PROCEDURE — 1123F ACP DISCUSS/DSCN MKR DOCD: CPT | Performed by: FAMILY MEDICINE

## 2025-03-07 PROCEDURE — G2211 COMPLEX E/M VISIT ADD ON: HCPCS | Performed by: FAMILY MEDICINE

## 2025-03-07 PROCEDURE — 99214 OFFICE O/P EST MOD 30 MIN: CPT | Performed by: FAMILY MEDICINE

## 2025-03-07 PROCEDURE — 1159F MED LIST DOCD IN RCRD: CPT | Performed by: FAMILY MEDICINE

## 2025-03-07 RX ORDER — FUROSEMIDE 20 MG/1
20 TABLET ORAL DAILY
Qty: 90 TABLET | Refills: 1 | Status: SHIPPED | OUTPATIENT
Start: 2025-03-07

## 2025-03-07 RX ORDER — ALLOPURINOL 100 MG/1
100 TABLET ORAL DAILY
Qty: 90 TABLET | Refills: 1 | Status: SHIPPED | OUTPATIENT
Start: 2025-03-07

## 2025-03-07 RX ORDER — DOXAZOSIN 1 MG/1
1 TABLET ORAL DAILY
Qty: 90 TABLET | Refills: 1 | Status: SHIPPED | OUTPATIENT
Start: 2025-03-07

## 2025-03-07 RX ORDER — FLUTICASONE PROPIONATE 50 MCG
2 SPRAY, SUSPENSION (ML) NASAL DAILY
Qty: 3 EACH | Refills: 3 | Status: SHIPPED | OUTPATIENT
Start: 2025-03-07

## 2025-03-07 RX ORDER — CARVEDILOL 3.12 MG/1
3.12 TABLET ORAL 2 TIMES DAILY WITH MEALS
Qty: 180 TABLET | Refills: 1 | Status: SHIPPED | OUTPATIENT
Start: 2025-03-07

## 2025-03-07 RX ORDER — OLMESARTAN MEDOXOMIL 40 MG/1
40 TABLET ORAL DAILY
Qty: 90 TABLET | Refills: 1 | Status: SHIPPED | OUTPATIENT
Start: 2025-03-07

## 2025-03-07 ASSESSMENT — ENCOUNTER SYMPTOMS
ABDOMINAL PAIN: 0
BLOOD IN STOOL: 0
CHEST TIGHTNESS: 0
SHORTNESS OF BREATH: 0

## 2025-03-07 NOTE — PROGRESS NOTES
[facility]  _______________________________________  MD Salome Asif, DO Amalia Pearson, MD Sandra Enciso MD    [address] [place] [address]  Phone: [phone]  Fax: [phone]    Tad Castano (:  1940) is a 84 y.o. female,Established patient, here for evaluation of the following chief complaint(s):  Hypertension and Gout      Assessment & Plan   ASSESSMENT/PLAN:    1. Chronic gout involving toe of right foot without tophus, unspecified cause  Stable, continue current regimen.   Check urate.   - allopurinol (ZYLOPRIM) 100 MG tablet; Take 1 tablet by mouth daily  Dispense: 90 tablet; Refill: 1  - Uric Acid; Future    2. Benign essential HTN  BP great today.   Stable. Continue current regimen, check renal function.    - carvedilol (COREG) 3.125 MG tablet; Take 1 tablet by mouth 2 times daily (with meals)  Dispense: 180 tablet; Refill: 1  - doxazosin (CARDURA) 1 MG tablet; Take 1 tablet by mouth daily  Dispense: 90 tablet; Refill: 1  - furosemide (LASIX) 20 MG tablet; Take 1 tablet by mouth daily  Dispense: 90 tablet; Refill: 1  - olmesartan (BENICAR) 40 MG tablet; Take 1 tablet by mouth daily TAKE ONE TABLET BY MOUTH ONE TIME DAILY  Dispense: 90 tablet; Refill: 1  - Comprehensive Metabolic Panel; Future    3. Allergic rhinitis, unspecified seasonality, unspecified trigger  Stable, continue current regimen.     - fluticasone (FLONASE) 50 MCG/ACT nasal spray; 2 sprays by Nasal route daily  Dispense: 3 each; Refill: 3    4. Stage 3b chronic kidney disease (CKD) (HCC)  Check GFR today while she's here today.   - Comprehensive Metabolic Panel; Future          Return in about 6 months (around 2025).      Subjective   SUBJECTIVE/OBJECTIVE:      HTN: She is currently also being seen by cardiology yearly. She was worked up for CP and this was negative. She has multiple medication intolerances including swelling with

## 2025-03-10 ENCOUNTER — RESULTS FOLLOW-UP (OUTPATIENT)
Dept: FAMILY MEDICINE CLINIC | Facility: CLINIC | Age: 85
End: 2025-03-10

## 2025-07-06 DIAGNOSIS — M1A.9XX0 CHRONIC GOUT INVOLVING TOE OF RIGHT FOOT WITHOUT TOPHUS, UNSPECIFIED CAUSE: ICD-10-CM

## 2025-07-07 RX ORDER — ALLOPURINOL 100 MG/1
100 TABLET ORAL DAILY
Qty: 90 TABLET | Refills: 1 | OUTPATIENT
Start: 2025-07-07